# Patient Record
Sex: MALE | Race: WHITE | NOT HISPANIC OR LATINO | Employment: FULL TIME | URBAN - METROPOLITAN AREA
[De-identification: names, ages, dates, MRNs, and addresses within clinical notes are randomized per-mention and may not be internally consistent; named-entity substitution may affect disease eponyms.]

---

## 2020-01-22 ENCOUNTER — OFFICE VISIT (OUTPATIENT)
Dept: PRIMARY CARE CLINIC | Facility: CLINIC | Age: 35
End: 2020-01-22
Payer: COMMERCIAL

## 2020-01-22 VITALS
TEMPERATURE: 98 F | RESPIRATION RATE: 18 BRPM | DIASTOLIC BLOOD PRESSURE: 86 MMHG | OXYGEN SATURATION: 98 % | SYSTOLIC BLOOD PRESSURE: 140 MMHG | BODY MASS INDEX: 24.81 KG/M2 | HEIGHT: 69 IN | HEART RATE: 68 BPM | WEIGHT: 167.5 LBS

## 2020-01-22 DIAGNOSIS — Z87.442 HISTORY OF NEPHROLITHIASIS: ICD-10-CM

## 2020-01-22 DIAGNOSIS — L30.1 DYSHIDROSIS: ICD-10-CM

## 2020-01-22 DIAGNOSIS — Z23 NEED FOR VACCINATION: ICD-10-CM

## 2020-01-22 DIAGNOSIS — I10 HYPERTENSION, UNSPECIFIED TYPE: Primary | ICD-10-CM

## 2020-01-22 DIAGNOSIS — F32.A DEPRESSION, UNSPECIFIED DEPRESSION TYPE: ICD-10-CM

## 2020-01-22 PROCEDURE — 3008F PR BODY MASS INDEX (BMI) DOCUMENTED: ICD-10-PCS | Mod: CPTII,S$GLB,, | Performed by: FAMILY MEDICINE

## 2020-01-22 PROCEDURE — 99203 OFFICE O/P NEW LOW 30 MIN: CPT | Mod: 25,S$GLB,, | Performed by: FAMILY MEDICINE

## 2020-01-22 PROCEDURE — 3079F PR MOST RECENT DIASTOLIC BLOOD PRESSURE 80-89 MM HG: ICD-10-PCS | Mod: CPTII,S$GLB,, | Performed by: FAMILY MEDICINE

## 2020-01-22 PROCEDURE — 90714 TD VACC NO PRESV 7 YRS+ IM: CPT | Mod: S$GLB,,, | Performed by: FAMILY MEDICINE

## 2020-01-22 PROCEDURE — 99999 PR PBB SHADOW E&M-NEW PATIENT-LVL III: CPT | Mod: PBBFAC,,, | Performed by: FAMILY MEDICINE

## 2020-01-22 PROCEDURE — 90471 TD VACCINE GREATER THAN OR EQUAL TO 7YO PRESERVATIVE FREE IM: ICD-10-PCS | Mod: S$GLB,,, | Performed by: FAMILY MEDICINE

## 2020-01-22 PROCEDURE — 99999 PR PBB SHADOW E&M-NEW PATIENT-LVL III: ICD-10-PCS | Mod: PBBFAC,,, | Performed by: FAMILY MEDICINE

## 2020-01-22 PROCEDURE — 3008F BODY MASS INDEX DOCD: CPT | Mod: CPTII,S$GLB,, | Performed by: FAMILY MEDICINE

## 2020-01-22 PROCEDURE — 3077F SYST BP >= 140 MM HG: CPT | Mod: CPTII,S$GLB,, | Performed by: FAMILY MEDICINE

## 2020-01-22 PROCEDURE — 90714 TD VACCINE GREATER THAN OR EQUAL TO 7YO PRESERVATIVE FREE IM: ICD-10-PCS | Mod: S$GLB,,, | Performed by: FAMILY MEDICINE

## 2020-01-22 PROCEDURE — 99203 PR OFFICE/OUTPT VISIT, NEW, LEVL III, 30-44 MIN: ICD-10-PCS | Mod: 25,S$GLB,, | Performed by: FAMILY MEDICINE

## 2020-01-22 PROCEDURE — 90471 IMMUNIZATION ADMIN: CPT | Mod: S$GLB,,, | Performed by: FAMILY MEDICINE

## 2020-01-22 PROCEDURE — 3077F PR MOST RECENT SYSTOLIC BLOOD PRESSURE >= 140 MM HG: ICD-10-PCS | Mod: CPTII,S$GLB,, | Performed by: FAMILY MEDICINE

## 2020-01-22 PROCEDURE — 3079F DIAST BP 80-89 MM HG: CPT | Mod: CPTII,S$GLB,, | Performed by: FAMILY MEDICINE

## 2020-01-22 RX ORDER — HYDROCHLOROTHIAZIDE 25 MG/1
25 TABLET ORAL DAILY
Qty: 30 TABLET | Refills: 1 | Status: SHIPPED | OUTPATIENT
Start: 2020-01-22 | End: 2020-05-05

## 2020-01-22 RX ORDER — LOSARTAN POTASSIUM 25 MG/1
25 TABLET ORAL DAILY
COMMUNITY
End: 2020-01-22 | Stop reason: SDUPTHER

## 2020-01-22 RX ORDER — ESCITALOPRAM OXALATE 10 MG/1
10 TABLET ORAL DAILY
Qty: 30 TABLET | Refills: 1 | Status: SHIPPED | OUTPATIENT
Start: 2020-01-22 | End: 2020-05-05

## 2020-01-22 RX ORDER — LOSARTAN POTASSIUM 25 MG/1
25 TABLET ORAL DAILY
Qty: 30 TABLET | Refills: 1 | Status: SHIPPED | OUTPATIENT
Start: 2020-01-22 | End: 2020-05-05

## 2020-01-22 RX ORDER — HYDROCHLOROTHIAZIDE 25 MG/1
25 TABLET ORAL DAILY
COMMUNITY
End: 2020-01-22 | Stop reason: SDUPTHER

## 2020-01-22 RX ORDER — ESCITALOPRAM OXALATE 10 MG/1
10 TABLET ORAL DAILY
COMMUNITY
End: 2020-01-22 | Stop reason: SDUPTHER

## 2020-01-22 NOTE — PROGRESS NOTES
Subjective:       Patient ID: Pravin Galindo is a 34 y.o. male.    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  Patient was a  in a very busy crazy restaurant.  Patient been off his medication blood pressure now 140/86 was on hydrochlorothiazide losartan..  Hypertension runs in pay since family in the combination of losartan and hydrochlorothiazide works for his asthma.  Lexapro works well, levels out peaks and valleys.   ROS:  Skin: no psoriasis, eczema, skin cancer--history some decide row cyst on the hands bilaterally was treated with a cream  HEENT: No headache, ocular pain, blurred vision, diplopia, epistaxis, hoarseness change in voice, thyroid trouble  Lung: No pneumonia, asthma, Tb, wheezing, SOB, no smoking +VAPS   Heart: No chest pain, ankle edema, palpitations, MI, radha murmur, +hypertension, no  Hyperlipidemia--no stent bypass arrhythmia  Abdomen: No nausea, vomiting, diarrhea, constipation, ulcers, hepatitis, gallbladder disease, melena, hematochezia, hematemesis  : no UTI, renal disease, history nephrolithiasis x2  MS: no fractures, O/A, lupus, rheumatoid, gout--Hx fx left wrist or forearm   Neuro: No dizziness, LOC, seizures   No diabetes, no anemia, no anxiety, +  depression   , one daughter 2 years old, works as a teacher [employer] school, 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    Objective:   Physical Exam:  General: Well nourished, well developed, no acute distress +thin  Skin: No lesions  HEENT: Eyes PERRLA, EOM intact, nose patent, throat non-erythematous ears TMs clear  NECK: Supple, no bruits, No JVD, no nodes  Lungs: Clear, no rales, rhonchi, wheezing  Heart: Regular rate and rhythm, no murmurs, gallops, or rubs  Abdomen: flat, bowel sounds positive, no tenderness, or organomegaly  MS: Range of motion and muscle strength intact  reflexes 2/4  Neuro: Alert, CN intact, oriented X 3 Romberg negative heel-toe intact  Extremities: No cyanosis, clubbing, or edema         Assessment:       1. Hypertension, unspecified type    2. Depression, unspecified depression type    3. Need for vaccination    4. History of nephrolithiasis    5. Dyshidrosis        Plan:       Hypertension, unspecified type  -     hydroCHLOROthiazide (HYDRODIURIL) 25 MG tablet; Take 1 tablet (25 mg total) by mouth once daily.  Dispense: 30 tablet; Refill: 1  -     losartan (COZAAR) 25 MG tablet; Take 1 tablet (25 mg total) by mouth once daily.  Dispense: 30 tablet; Refill: 1  -     CBC auto differential; Future; Expected date: 01/22/2020  -     Comprehensive metabolic panel; Future; Expected date: 01/22/2020  -     EKG 12-lead; Future  -     Fecal Immunochemical Test (iFOBT); Future; Expected date: 01/22/2020  -     Lipid panel; Future; Expected date: 01/22/2020  -     X-Ray Chest PA And Lateral; Future; Expected date: 01/22/2020  -     POCT urine dipstick without microscope  -     T4, free; Future; Expected date: 01/22/2020  -     TSH; Future; Expected date: 01/22/2020    Depression, unspecified depression type  -     escitalopram oxalate (LEXAPRO) 10 MG tablet; Take 1 tablet (10 mg total) by mouth once daily.  Dispense: 30 tablet; Refill: 1    Need for vaccination  -     (In Office Administered) Td Vaccine - Preservative Free    History of nephrolithiasis    Dyshidrosis      history hypertension--did well on losartan 25 mg q.d. 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  History dyshidrosis can use Valisone cream p.r.n.  History of nephrolithiasis  Patient needs physical 1 desires CBCs CMP lipids T4 TSH stool guaiac UA chest x-ray EKG as a baseline physical  Health  maintenance lipid HIV tetanus flu shot

## 2020-01-22 NOTE — LETTER
January 22, 2020      No Recipients           [facility]  [address] [facility]  [facility] [address]  Phone: [phone]  Fax: [phone]          Patient: Pravin Galindo   MR Number: [id number]   YOB: 1985   Date of Visit: 1/22/2020       Dear :    Thank you for referring Pravin Galindo to me for evaluation. Attached you will find relevant portions of my assessment and plan of care.    If you have questions, please do not hesitate to call me. I look forward to following Pravin Galindo along with you.    Sincerely,    Phillip Castañeda MD    Enclosure  CC:  No Recipients    If you would like to receive this communication electronically, please contact [email or web address] or [phone] to request more information on [employer] Link access.    For providers and/or their staff who would like to refer a patient to [facility], please contact us through our one-stop-shop provider referral line, Nimo Swift, at [phone].    If you feel you have received this communication in error or would no longer like to receive these types of communications, please e-mail [email or web address]

## 2020-01-29 ENCOUNTER — CLINICAL SUPPORT (OUTPATIENT)
Dept: PRIMARY CARE CLINIC | Facility: CLINIC | Age: 35
End: 2020-01-29
Payer: COMMERCIAL

## 2020-01-29 DIAGNOSIS — I10 HYPERTENSION, UNSPECIFIED TYPE: ICD-10-CM

## 2020-01-29 LAB
ALBUMIN SERPL BCP-MCNC: 5.1 G/DL (ref 3.5–5.2)
ALP SERPL-CCNC: 50 U/L (ref 38–126)
ALT SERPL W/O P-5'-P-CCNC: 17 U/L (ref 17–63)
ANION GAP SERPL CALC-SCNC: 14 MMOL/L (ref 8–16)
AST SERPL-CCNC: 27 U/L (ref 15–41)
BASOPHILS # BLD AUTO: 0 K/UL (ref 0–0.2)
BASOPHILS NFR BLD: 1 % (ref 0–1.9)
BILIRUB SERPL-MCNC: 1.2 MG/DL (ref 0.3–1.2)
BUN SERPL-MCNC: 11 MG/DL (ref 6–20)
CALCIUM SERPL-MCNC: 9.5 MG/DL (ref 8.6–10)
CHLORIDE SERPL-SCNC: 99 MMOL/L (ref 101–111)
CHOLEST SERPL-MCNC: 243 MG/DL (ref 80–200)
CHOLEST/HDLC SERPL: 2.7 {RATIO} (ref 2–5)
CO2 SERPL-SCNC: 23 MMOL/L (ref 23–29)
CREAT SERPL-MCNC: 0.8 MG/DL (ref 0.5–1.4)
DIFFERENTIAL METHOD: ABNORMAL
EOSINOPHIL # BLD AUTO: 0.1 K/UL (ref 0–0.5)
EOSINOPHIL NFR BLD: 1.9 % (ref 0–8)
ERYTHROCYTE [DISTWIDTH] IN BLOOD BY AUTOMATED COUNT: 13.8 % (ref 11.5–14.5)
EST. GFR  (AFRICAN AMERICAN): >60 ML/MIN/1.73 M^2
EST. GFR  (NON AFRICAN AMERICAN): >60 ML/MIN/1.73 M^2
GLUCOSE SERPL-MCNC: 96 MG/DL (ref 74–118)
HCT VFR BLD AUTO: 46.3 % (ref 40–54)
HDLC SERPL-MCNC: 91 MG/DL (ref 40–75)
HDLC SERPL: 37.4 % (ref 20–50)
HGB BLD-MCNC: 16 G/DL (ref 14–18)
LDLC SERPL CALC-MCNC: 137 MG/DL
LYMPHOCYTES # BLD AUTO: 2.3 K/UL (ref 1–4.8)
LYMPHOCYTES NFR BLD: 50.1 % (ref 18–48)
MCH RBC QN AUTO: 32.2 PG (ref 27–31)
MCHC RBC AUTO-ENTMCNC: 34.4 G/DL (ref 32–36)
MCV RBC AUTO: 94 FL (ref 82–98)
MONOCYTES # BLD AUTO: 0.4 K/UL (ref 0.3–1)
MONOCYTES NFR BLD: 7.7 % (ref 4–15)
NEUTROPHILS # BLD AUTO: 1.8 K/UL (ref 1.8–7.7)
NEUTROPHILS NFR BLD: 39.3 % (ref 38–73)
NONHDLC SERPL-MCNC: 152 MG/DL
PLATELET # BLD AUTO: 256 K/UL (ref 150–350)
PMV BLD AUTO: 7.8 FL (ref 9.2–12.9)
POTASSIUM SERPL-SCNC: 3.4 MMOL/L (ref 3.5–5.1)
PROT SERPL-MCNC: 8 G/DL (ref 6–8.4)
RBC # BLD AUTO: 4.95 M/UL (ref 4.6–6.2)
SODIUM SERPL-SCNC: 136 MMOL/L (ref 136–145)
T4 FREE SERPL-MCNC: 1.01 NG/DL (ref 0.61–1.12)
TRIGL SERPL-MCNC: 75 MG/DL (ref 30–150)
TSH SERPL DL<=0.005 MIU/L-ACNC: 0.8 UIU/ML (ref 0.45–5.33)
WBC # BLD AUTO: 4.7 K/UL (ref 3.9–12.7)

## 2020-01-29 PROCEDURE — 84439 ASSAY OF FREE THYROXINE: CPT

## 2020-01-29 PROCEDURE — 36415 COLL VENOUS BLD VENIPUNCTURE: CPT | Mod: S$GLB,,, | Performed by: FAMILY MEDICINE

## 2020-01-29 PROCEDURE — 36415 PR COLLECTION VENOUS BLOOD,VENIPUNCTURE: ICD-10-PCS | Mod: S$GLB,,, | Performed by: FAMILY MEDICINE

## 2020-01-29 PROCEDURE — 80053 COMPREHEN METABOLIC PANEL: CPT

## 2020-01-29 PROCEDURE — 93010 EKG 12-LEAD: ICD-10-PCS | Mod: S$GLB,,, | Performed by: INTERNAL MEDICINE

## 2020-01-29 PROCEDURE — 85025 COMPLETE CBC W/AUTO DIFF WBC: CPT

## 2020-01-29 PROCEDURE — 84443 ASSAY THYROID STIM HORMONE: CPT

## 2020-01-29 PROCEDURE — 93005 EKG 12-LEAD: ICD-10-PCS | Mod: S$GLB,,, | Performed by: FAMILY MEDICINE

## 2020-01-29 PROCEDURE — 80061 LIPID PANEL: CPT

## 2020-01-29 PROCEDURE — 93010 ELECTROCARDIOGRAM REPORT: CPT | Mod: S$GLB,,, | Performed by: INTERNAL MEDICINE

## 2020-01-29 PROCEDURE — 93005 ELECTROCARDIOGRAM TRACING: CPT | Mod: S$GLB,,, | Performed by: FAMILY MEDICINE

## 2020-02-05 DIAGNOSIS — I10 HYPERTENSION, UNSPECIFIED TYPE: Primary | ICD-10-CM

## 2020-02-05 NOTE — TELEPHONE ENCOUNTER
----- Message from Phillip Castañeda MD sent at 2/1/2020  5:54 PM CST -----  Call tell pt  K 3.4 needs be on Micro K 10 1 po q d #30 5 refills Chol 243 better 180  betternif 100 HDL 91 excellent Low Na Low fat diet exercise as abler decrease weight if needed Rest lab WNL no new tx redo lab 6 mo CBC.CMP, lipid see me 2 weeks later

## 2020-02-10 RX ORDER — POTASSIUM CHLORIDE 750 MG/1
10 CAPSULE, EXTENDED RELEASE ORAL DAILY
Qty: 30 CAPSULE | Refills: 5 | Status: SHIPPED | OUTPATIENT
Start: 2020-02-10 | End: 2020-08-16

## 2020-05-01 DIAGNOSIS — F32.A DEPRESSION, UNSPECIFIED DEPRESSION TYPE: ICD-10-CM

## 2020-05-01 DIAGNOSIS — I10 HYPERTENSION, UNSPECIFIED TYPE: ICD-10-CM

## 2020-05-05 RX ORDER — ESCITALOPRAM OXALATE 10 MG/1
TABLET ORAL
Qty: 90 TABLET | Refills: 1 | Status: SHIPPED | OUTPATIENT
Start: 2020-05-05 | End: 2021-06-08

## 2020-05-05 RX ORDER — LOSARTAN POTASSIUM 25 MG/1
TABLET ORAL
Qty: 90 TABLET | Refills: 1 | Status: SHIPPED | OUTPATIENT
Start: 2020-05-05 | End: 2021-06-08

## 2020-05-05 RX ORDER — HYDROCHLOROTHIAZIDE 25 MG/1
TABLET ORAL
Qty: 90 TABLET | Refills: 1 | Status: SHIPPED | OUTPATIENT
Start: 2020-05-05 | End: 2021-06-08

## 2020-09-18 ENCOUNTER — TELEPHONE (OUTPATIENT)
Dept: PRIMARY CARE CLINIC | Facility: CLINIC | Age: 35
End: 2020-09-18

## 2021-04-20 ENCOUNTER — PATIENT OUTREACH (OUTPATIENT)
Dept: ADMINISTRATIVE | Facility: HOSPITAL | Age: 36
End: 2021-04-20

## 2021-06-08 ENCOUNTER — OFFICE VISIT (OUTPATIENT)
Dept: PRIMARY CARE CLINIC | Facility: CLINIC | Age: 36
End: 2021-06-08
Payer: COMMERCIAL

## 2021-06-08 VITALS
HEIGHT: 69 IN | BODY MASS INDEX: 24.31 KG/M2 | OXYGEN SATURATION: 98 % | RESPIRATION RATE: 18 BRPM | HEART RATE: 104 BPM | DIASTOLIC BLOOD PRESSURE: 100 MMHG | SYSTOLIC BLOOD PRESSURE: 164 MMHG | WEIGHT: 164.13 LBS

## 2021-06-08 DIAGNOSIS — Z11.59 NEED FOR HEPATITIS C SCREENING TEST: Primary | ICD-10-CM

## 2021-06-08 DIAGNOSIS — F32.A DEPRESSION, UNSPECIFIED DEPRESSION TYPE: ICD-10-CM

## 2021-06-08 DIAGNOSIS — I10 HYPERTENSION, UNSPECIFIED TYPE: ICD-10-CM

## 2021-06-08 PROCEDURE — 1126F PR PAIN SEVERITY QUANTIFIED, NO PAIN PRESENT: ICD-10-PCS | Mod: S$GLB,,, | Performed by: FAMILY MEDICINE

## 2021-06-08 PROCEDURE — 3008F BODY MASS INDEX DOCD: CPT | Mod: CPTII,S$GLB,, | Performed by: FAMILY MEDICINE

## 2021-06-08 PROCEDURE — 1126F AMNT PAIN NOTED NONE PRSNT: CPT | Mod: S$GLB,,, | Performed by: FAMILY MEDICINE

## 2021-06-08 PROCEDURE — 99999 PR PBB SHADOW E&M-EST. PATIENT-LVL III: CPT | Mod: PBBFAC,,, | Performed by: FAMILY MEDICINE

## 2021-06-08 PROCEDURE — 99395 PR PREVENTIVE VISIT,EST,18-39: ICD-10-PCS | Mod: S$GLB,,, | Performed by: FAMILY MEDICINE

## 2021-06-08 PROCEDURE — 99395 PREV VISIT EST AGE 18-39: CPT | Mod: S$GLB,,, | Performed by: FAMILY MEDICINE

## 2021-06-08 PROCEDURE — 99999 PR PBB SHADOW E&M-EST. PATIENT-LVL III: ICD-10-PCS | Mod: PBBFAC,,, | Performed by: FAMILY MEDICINE

## 2021-06-08 PROCEDURE — 3008F PR BODY MASS INDEX (BMI) DOCUMENTED: ICD-10-PCS | Mod: CPTII,S$GLB,, | Performed by: FAMILY MEDICINE

## 2021-06-08 RX ORDER — LOSARTAN POTASSIUM AND HYDROCHLOROTHIAZIDE 25; 100 MG/1; MG/1
1 TABLET ORAL DAILY
Qty: 90 TABLET | Refills: 3 | Status: SHIPPED | OUTPATIENT
Start: 2021-06-08 | End: 2021-06-24

## 2021-06-22 ENCOUNTER — CLINICAL SUPPORT (OUTPATIENT)
Dept: PRIMARY CARE CLINIC | Facility: CLINIC | Age: 36
End: 2021-06-22
Payer: COMMERCIAL

## 2021-06-22 VITALS — DIASTOLIC BLOOD PRESSURE: 68 MMHG | OXYGEN SATURATION: 97 % | SYSTOLIC BLOOD PRESSURE: 114 MMHG | HEART RATE: 107 BPM

## 2021-06-22 DIAGNOSIS — I10 HYPERTENSION, UNSPECIFIED TYPE: Primary | ICD-10-CM

## 2021-06-22 PROCEDURE — 99999 PR PBB SHADOW E&M-EST. PATIENT-LVL II: ICD-10-PCS | Mod: PBBFAC,,,

## 2021-06-22 PROCEDURE — 99999 PR PBB SHADOW E&M-EST. PATIENT-LVL II: CPT | Mod: PBBFAC,,,

## 2021-06-24 DIAGNOSIS — E83.52 HYPERCALCEMIA: Primary | ICD-10-CM

## 2021-06-24 RX ORDER — LOSARTAN POTASSIUM 100 MG/1
100 TABLET ORAL DAILY
Qty: 90 TABLET | Refills: 3 | Status: SHIPPED | OUTPATIENT
Start: 2021-06-24 | End: 2021-07-09 | Stop reason: CLARIF

## 2021-06-30 ENCOUNTER — TELEPHONE (OUTPATIENT)
Dept: PRIMARY CARE CLINIC | Facility: CLINIC | Age: 36
End: 2021-06-30

## 2021-07-09 RX ORDER — LOSARTAN POTASSIUM AND HYDROCHLOROTHIAZIDE 25; 100 MG/1; MG/1
1 TABLET ORAL DAILY
COMMUNITY
End: 2022-08-05

## 2022-06-14 ENCOUNTER — NURSE TRIAGE (OUTPATIENT)
Dept: ADMINISTRATIVE | Facility: CLINIC | Age: 37
End: 2022-06-14
Payer: COMMERCIAL

## 2022-06-14 NOTE — TELEPHONE ENCOUNTER
OOC RN   Patient calling about experiencing heart tightness and seizing up. Feels like something is  Sitting on chest,  Starting yesterday, pain 7/10  Intermittent,   Care advise is to call up.  911  Patient and wife want to just go to ED stating they only live 5 mins away.  I advise care advise is to call 911 now.   Wife and  VU.   Reason for Disposition   SEVERE difficulty breathing (e.g., struggling for each breath, speaks in single words)    Protocols used: CHEST PAIN-A-OH

## 2022-06-28 ENCOUNTER — TELEPHONE (OUTPATIENT)
Dept: PSYCHOLOGY | Facility: CLINIC | Age: 37
End: 2022-06-28
Payer: COMMERCIAL

## 2022-06-28 ENCOUNTER — OFFICE VISIT (OUTPATIENT)
Dept: PRIMARY CARE CLINIC | Facility: CLINIC | Age: 37
End: 2022-06-28
Payer: COMMERCIAL

## 2022-06-28 VITALS
WEIGHT: 162.81 LBS | OXYGEN SATURATION: 96 % | SYSTOLIC BLOOD PRESSURE: 128 MMHG | HEART RATE: 85 BPM | RESPIRATION RATE: 18 BRPM | HEIGHT: 69 IN | DIASTOLIC BLOOD PRESSURE: 84 MMHG | BODY MASS INDEX: 24.11 KG/M2

## 2022-06-28 DIAGNOSIS — Z87.442 HISTORY OF NEPHROLITHIASIS: Primary | ICD-10-CM

## 2022-06-28 DIAGNOSIS — S39.012D LUMBOSACRAL STRAIN, SUBSEQUENT ENCOUNTER: ICD-10-CM

## 2022-06-28 DIAGNOSIS — R07.89 ATYPICAL CHEST PAIN: ICD-10-CM

## 2022-06-28 DIAGNOSIS — Z00.00 ANNUAL PHYSICAL EXAM: ICD-10-CM

## 2022-06-28 DIAGNOSIS — I10 HYPERTENSION, UNSPECIFIED TYPE: ICD-10-CM

## 2022-06-28 DIAGNOSIS — F32.9 MAJOR DEPRESSIVE DISORDER WITH SINGLE EPISODE, REMISSION STATUS UNSPECIFIED: ICD-10-CM

## 2022-06-28 PROBLEM — S39.012A LUMBOSACRAL STRAIN: Status: ACTIVE | Noted: 2022-06-28

## 2022-06-28 PROCEDURE — 93010 EKG 12-LEAD: ICD-10-PCS | Mod: S$GLB,,, | Performed by: INTERNAL MEDICINE

## 2022-06-28 PROCEDURE — 3074F SYST BP LT 130 MM HG: CPT | Mod: CPTII,S$GLB,, | Performed by: FAMILY MEDICINE

## 2022-06-28 PROCEDURE — 3008F PR BODY MASS INDEX (BMI) DOCUMENTED: ICD-10-PCS | Mod: CPTII,S$GLB,, | Performed by: FAMILY MEDICINE

## 2022-06-28 PROCEDURE — 3079F PR MOST RECENT DIASTOLIC BLOOD PRESSURE 80-89 MM HG: ICD-10-PCS | Mod: CPTII,S$GLB,, | Performed by: FAMILY MEDICINE

## 2022-06-28 PROCEDURE — 3074F PR MOST RECENT SYSTOLIC BLOOD PRESSURE < 130 MM HG: ICD-10-PCS | Mod: CPTII,S$GLB,, | Performed by: FAMILY MEDICINE

## 2022-06-28 PROCEDURE — 93010 ELECTROCARDIOGRAM REPORT: CPT | Mod: S$GLB,,, | Performed by: INTERNAL MEDICINE

## 2022-06-28 PROCEDURE — 99214 OFFICE O/P EST MOD 30 MIN: CPT | Mod: S$GLB,,, | Performed by: FAMILY MEDICINE

## 2022-06-28 PROCEDURE — 99214 PR OFFICE/OUTPT VISIT, EST, LEVL IV, 30-39 MIN: ICD-10-PCS | Mod: S$GLB,,, | Performed by: FAMILY MEDICINE

## 2022-06-28 PROCEDURE — 93005 ELECTROCARDIOGRAM TRACING: CPT | Mod: S$GLB,,, | Performed by: FAMILY MEDICINE

## 2022-06-28 PROCEDURE — 3079F DIAST BP 80-89 MM HG: CPT | Mod: CPTII,S$GLB,, | Performed by: FAMILY MEDICINE

## 2022-06-28 PROCEDURE — 99999 PR PBB SHADOW E&M-EST. PATIENT-LVL III: ICD-10-PCS | Mod: PBBFAC,,, | Performed by: FAMILY MEDICINE

## 2022-06-28 PROCEDURE — 93005 EKG 12-LEAD: ICD-10-PCS | Mod: S$GLB,,, | Performed by: FAMILY MEDICINE

## 2022-06-28 PROCEDURE — 3008F BODY MASS INDEX DOCD: CPT | Mod: CPTII,S$GLB,, | Performed by: FAMILY MEDICINE

## 2022-06-28 PROCEDURE — 99999 PR PBB SHADOW E&M-EST. PATIENT-LVL III: CPT | Mod: PBBFAC,,, | Performed by: FAMILY MEDICINE

## 2022-06-28 PROCEDURE — 1159F PR MEDICATION LIST DOCUMENTED IN MEDICAL RECORD: ICD-10-PCS | Mod: CPTII,S$GLB,, | Performed by: FAMILY MEDICINE

## 2022-06-28 PROCEDURE — 1159F MED LIST DOCD IN RCRD: CPT | Mod: CPTII,S$GLB,, | Performed by: FAMILY MEDICINE

## 2022-06-28 RX ORDER — LORAZEPAM 1 MG/1
TABLET ORAL
Qty: 30 TABLET | Refills: 2 | Status: SHIPPED | OUTPATIENT
Start: 2022-06-28 | End: 2023-06-28 | Stop reason: SDUPTHER

## 2022-06-28 RX ORDER — ESCITALOPRAM OXALATE 10 MG/1
10 TABLET ORAL DAILY
Qty: 30 TABLET | Refills: 11 | Status: SHIPPED | OUTPATIENT
Start: 2022-06-28 | End: 2023-06-28
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
## 2022-06-28 NOTE — TELEPHONE ENCOUNTER
----- Message from Zaida Cortes LCSW sent at 6/28/2022 10:50 AM CDT -----  Regarding: FW: Major depressive disorder with single episode, remission status unspecified    ----- Message -----  From: Christopher Reeder  Sent: 6/28/2022  10:25 AM CDT  To: Zaida Cortes LCSW  Subject: Major depressive disorder with single episod#    Dr. Phillip Castañeda has a  referral/consult to Psychiatry for this patient. Please schedule and call patient. Thank you!

## 2022-06-29 ENCOUNTER — PATIENT MESSAGE (OUTPATIENT)
Dept: DIABETES | Facility: CLINIC | Age: 37
End: 2022-06-29
Payer: COMMERCIAL

## 2022-06-29 ENCOUNTER — TELEPHONE (OUTPATIENT)
Dept: PSYCHOLOGY | Facility: CLINIC | Age: 37
End: 2022-06-29
Payer: COMMERCIAL

## 2022-06-29 NOTE — TELEPHONE ENCOUNTER
----- Message from Zaida Cortes LCSW sent at 6/29/2022  2:01 PM CDT -----    ----- Message -----  From: Tegan Gutierrez  Sent: 6/29/2022   1:49 PM CDT  To: Zaida Cortes LCSW    Major depressive disorder with single episode, remission status unspecified [F32... referral in please call patient back top schedule appointment

## 2022-07-12 ENCOUNTER — OFFICE VISIT (OUTPATIENT)
Dept: PRIMARY CARE CLINIC | Facility: CLINIC | Age: 37
End: 2022-07-12
Payer: COMMERCIAL

## 2022-07-12 VITALS
HEART RATE: 90 BPM | SYSTOLIC BLOOD PRESSURE: 126 MMHG | RESPIRATION RATE: 18 BRPM | OXYGEN SATURATION: 97 % | WEIGHT: 161.06 LBS | DIASTOLIC BLOOD PRESSURE: 76 MMHG | BODY MASS INDEX: 23.86 KG/M2 | HEIGHT: 69 IN

## 2022-07-12 DIAGNOSIS — I10 HYPERTENSION, UNSPECIFIED TYPE: ICD-10-CM

## 2022-07-12 DIAGNOSIS — Z87.442 HISTORY OF NEPHROLITHIASIS: ICD-10-CM

## 2022-07-12 DIAGNOSIS — F32.0 CURRENT MILD EPISODE OF MAJOR DEPRESSIVE DISORDER WITHOUT PRIOR EPISODE: Primary | ICD-10-CM

## 2022-07-12 DIAGNOSIS — S16.1XXD STRAIN OF NECK MUSCLE, SUBSEQUENT ENCOUNTER: ICD-10-CM

## 2022-07-12 DIAGNOSIS — L30.1 DYSHIDROSIS: ICD-10-CM

## 2022-07-12 PROBLEM — S16.1XXA CERVICAL STRAIN: Status: ACTIVE | Noted: 2022-07-12

## 2022-07-12 PROCEDURE — 99214 OFFICE O/P EST MOD 30 MIN: CPT | Mod: S$GLB,,, | Performed by: FAMILY MEDICINE

## 2022-07-12 PROCEDURE — 1159F MED LIST DOCD IN RCRD: CPT | Mod: CPTII,S$GLB,, | Performed by: FAMILY MEDICINE

## 2022-07-12 PROCEDURE — 1159F PR MEDICATION LIST DOCUMENTED IN MEDICAL RECORD: ICD-10-PCS | Mod: CPTII,S$GLB,, | Performed by: FAMILY MEDICINE

## 2022-07-12 PROCEDURE — 99999 PR PBB SHADOW E&M-EST. PATIENT-LVL III: CPT | Mod: PBBFAC,,, | Performed by: FAMILY MEDICINE

## 2022-07-12 PROCEDURE — 3008F PR BODY MASS INDEX (BMI) DOCUMENTED: ICD-10-PCS | Mod: CPTII,S$GLB,, | Performed by: FAMILY MEDICINE

## 2022-07-12 PROCEDURE — 99214 PR OFFICE/OUTPT VISIT, EST, LEVL IV, 30-39 MIN: ICD-10-PCS | Mod: S$GLB,,, | Performed by: FAMILY MEDICINE

## 2022-07-12 PROCEDURE — 99999 PR PBB SHADOW E&M-EST. PATIENT-LVL III: ICD-10-PCS | Mod: PBBFAC,,, | Performed by: FAMILY MEDICINE

## 2022-07-12 PROCEDURE — 3074F PR MOST RECENT SYSTOLIC BLOOD PRESSURE < 130 MM HG: ICD-10-PCS | Mod: CPTII,S$GLB,, | Performed by: FAMILY MEDICINE

## 2022-07-12 PROCEDURE — 3078F DIAST BP <80 MM HG: CPT | Mod: CPTII,S$GLB,, | Performed by: FAMILY MEDICINE

## 2022-07-12 PROCEDURE — 3008F BODY MASS INDEX DOCD: CPT | Mod: CPTII,S$GLB,, | Performed by: FAMILY MEDICINE

## 2022-07-12 PROCEDURE — 3078F PR MOST RECENT DIASTOLIC BLOOD PRESSURE < 80 MM HG: ICD-10-PCS | Mod: CPTII,S$GLB,, | Performed by: FAMILY MEDICINE

## 2022-07-12 PROCEDURE — 3074F SYST BP LT 130 MM HG: CPT | Mod: CPTII,S$GLB,, | Performed by: FAMILY MEDICINE
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
## 2022-08-04 NOTE — TELEPHONE ENCOUNTER
No new care gaps identified.  [facility] Embedded Care Gaps. Reference number: [id number]. 8/04/2022   3:40:54 AM ETTAT

## 2022-08-04 NOTE — TELEPHONE ENCOUNTER
Refill Routing Note   Medication(s) are not appropriate for processing by [facility] for the following reason(s):      - Required laboratory values are outdated    [facility] action(s):  Defer          Medication reconciliation completed: No     Appointments  past 12m or future 3m with PCP    Date Provider   Last Visit   7/12/2022 Phillip Castañeda MD   Next Visit   Visit date not found Phillip Castañeda MD   ED visits in past 90 days: 0        Note composed:1:38 PM 08/04/2022

## 2022-08-05 RX ORDER — LOSARTAN POTASSIUM AND HYDROCHLOROTHIAZIDE 25; 100 MG/1; MG/1
TABLET ORAL
Qty: 90 TABLET | Refills: 3 | Status: SHIPPED | OUTPATIENT
Start: 2022-08-05 | End: 2023-06-28 | Stop reason: SDUPTHER

## 2022-10-31 DIAGNOSIS — R79.89 ELEVATED LIVER FUNCTION TESTS: Primary | ICD-10-CM

## 2022-11-02 ENCOUNTER — OFFICE VISIT (OUTPATIENT)
Dept: PRIMARY CARE CLINIC | Facility: CLINIC | Age: 37
End: 2022-11-02
Payer: COMMERCIAL

## 2022-11-02 DIAGNOSIS — R71.8 ELEVATED MCV: ICD-10-CM

## 2022-11-02 DIAGNOSIS — R79.89 ELEVATED LIVER FUNCTION TESTS: ICD-10-CM

## 2022-11-02 DIAGNOSIS — R76.8 ELEVATED ANTINUCLEAR ANTIBODY (ANA) LEVEL: ICD-10-CM

## 2022-11-02 DIAGNOSIS — R76.8 ELEVATED RHEUMATOID FACTOR: ICD-10-CM

## 2022-11-02 DIAGNOSIS — R56.9 CONVULSIONS, UNSPECIFIED CONVULSION TYPE: Primary | ICD-10-CM

## 2022-11-02 PROCEDURE — 99214 OFFICE O/P EST MOD 30 MIN: CPT | Mod: 95,,, | Performed by: FAMILY MEDICINE

## 2022-11-02 PROCEDURE — 3044F HG A1C LEVEL LT 7.0%: CPT | Mod: CPTII,95,, | Performed by: FAMILY MEDICINE

## 2022-11-02 PROCEDURE — 99214 PR OFFICE/OUTPT VISIT, EST, LEVL IV, 30-39 MIN: ICD-10-PCS | Mod: 95,,, | Performed by: FAMILY MEDICINE

## 2022-11-02 PROCEDURE — 3044F PR MOST RECENT HEMOGLOBIN A1C LEVEL <7.0%: ICD-10-PCS | Mod: CPTII,95,, | Performed by: FAMILY MEDICINE
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
## 2022-11-07 ENCOUNTER — TELEPHONE (OUTPATIENT)
Dept: RHEUMATOLOGY | Facility: CLINIC | Age: 37
End: 2022-11-07
Payer: COMMERCIAL

## 2022-11-07 NOTE — TELEPHONE ENCOUNTER
Called the patient and left a message letting him know that I received his referral and that I scheduled him with the first available in Rheumatology. I scheduled the patient with Dr Matson and Dr Tavarez and placed the reminder in the mail. If this appointment does not work he can call to reschedule and be placed on the wait list

## 2022-11-19 DIAGNOSIS — R10.84 GENERALIZED ABDOMINAL PAIN: Primary | ICD-10-CM

## 2022-12-28 ENCOUNTER — OFFICE VISIT (OUTPATIENT)
Dept: RHEUMATOLOGY | Facility: CLINIC | Age: 37
End: 2022-12-28
Payer: COMMERCIAL

## 2022-12-28 ENCOUNTER — LAB VISIT (OUTPATIENT)
Dept: LAB | Facility: HOSPITAL | Age: 37
End: 2022-12-28
Payer: COMMERCIAL

## 2022-12-28 VITALS
HEART RATE: 99 BPM | WEIGHT: 171.06 LBS | HEIGHT: 69 IN | SYSTOLIC BLOOD PRESSURE: 142 MMHG | DIASTOLIC BLOOD PRESSURE: 95 MMHG | BODY MASS INDEX: 25.34 KG/M2

## 2022-12-28 DIAGNOSIS — R76.8 ELEVATED ANTINUCLEAR ANTIBODY (ANA) LEVEL: ICD-10-CM

## 2022-12-28 DIAGNOSIS — R76.8 ELEVATED RHEUMATOID FACTOR: ICD-10-CM

## 2022-12-28 DIAGNOSIS — R79.89 ELEVATED LFTS: ICD-10-CM

## 2022-12-28 DIAGNOSIS — R79.89 ELEVATED LFTS: Primary | ICD-10-CM

## 2022-12-28 LAB
FERRITIN SERPL-MCNC: 813 NG/ML (ref 20–300)
IRON SERPL-MCNC: 108 UG/DL (ref 45–160)
SATURATED IRON: 29 % (ref 20–50)
TOTAL IRON BINDING CAPACITY: 373 UG/DL (ref 250–450)
TRANSFERRIN SERPL-MCNC: 252 MG/DL (ref 200–375)
TRANSFERRIN SERPL-MCNC: 252 MG/DL (ref 200–375)

## 2022-12-28 PROCEDURE — 3077F PR MOST RECENT SYSTOLIC BLOOD PRESSURE >= 140 MM HG: ICD-10-PCS | Mod: CPTII,S$GLB,, | Performed by: INTERNAL MEDICINE

## 2022-12-28 PROCEDURE — 86381 MITOCHONDRIAL ANTIBODY EACH: CPT | Performed by: INTERNAL MEDICINE

## 2022-12-28 PROCEDURE — 1159F MED LIST DOCD IN RCRD: CPT | Mod: CPTII,S$GLB,, | Performed by: INTERNAL MEDICINE

## 2022-12-28 PROCEDURE — 82728 ASSAY OF FERRITIN: CPT | Performed by: INTERNAL MEDICINE

## 2022-12-28 PROCEDURE — 84466 ASSAY OF TRANSFERRIN: CPT | Performed by: INTERNAL MEDICINE

## 2022-12-28 PROCEDURE — 3077F SYST BP >= 140 MM HG: CPT | Mod: CPTII,S$GLB,, | Performed by: INTERNAL MEDICINE

## 2022-12-28 PROCEDURE — 99204 PR OFFICE/OUTPT VISIT, NEW, LEVL IV, 45-59 MIN: ICD-10-PCS | Mod: S$GLB,,, | Performed by: INTERNAL MEDICINE

## 2022-12-28 PROCEDURE — 3080F DIAST BP >= 90 MM HG: CPT | Mod: CPTII,S$GLB,, | Performed by: INTERNAL MEDICINE

## 2022-12-28 PROCEDURE — 3044F PR MOST RECENT HEMOGLOBIN A1C LEVEL <7.0%: ICD-10-PCS | Mod: CPTII,S$GLB,, | Performed by: INTERNAL MEDICINE

## 2022-12-28 PROCEDURE — 99204 OFFICE O/P NEW MOD 45 MIN: CPT | Mod: S$GLB,,, | Performed by: INTERNAL MEDICINE

## 2022-12-28 PROCEDURE — 3044F HG A1C LEVEL LT 7.0%: CPT | Mod: CPTII,S$GLB,, | Performed by: INTERNAL MEDICINE

## 2022-12-28 PROCEDURE — 3008F PR BODY MASS INDEX (BMI) DOCUMENTED: ICD-10-PCS | Mod: CPTII,S$GLB,, | Performed by: INTERNAL MEDICINE

## 2022-12-28 PROCEDURE — 1160F RVW MEDS BY RX/DR IN RCRD: CPT | Mod: CPTII,S$GLB,, | Performed by: INTERNAL MEDICINE

## 2022-12-28 PROCEDURE — 1160F PR REVIEW ALL MEDS BY PRESCRIBER/CLIN PHARMACIST DOCUMENTED: ICD-10-PCS | Mod: CPTII,S$GLB,, | Performed by: INTERNAL MEDICINE

## 2022-12-28 PROCEDURE — 36415 COLL VENOUS BLD VENIPUNCTURE: CPT | Performed by: INTERNAL MEDICINE

## 2022-12-28 PROCEDURE — 1159F PR MEDICATION LIST DOCUMENTED IN MEDICAL RECORD: ICD-10-PCS | Mod: CPTII,S$GLB,, | Performed by: INTERNAL MEDICINE

## 2022-12-28 PROCEDURE — 99999 PR PBB SHADOW E&M-EST. PATIENT-LVL III: ICD-10-PCS | Mod: PBBFAC,,, | Performed by: INTERNAL MEDICINE

## 2022-12-28 PROCEDURE — 3008F BODY MASS INDEX DOCD: CPT | Mod: CPTII,S$GLB,, | Performed by: INTERNAL MEDICINE

## 2022-12-28 PROCEDURE — 86015 ACTIN ANTIBODY EACH: CPT | Performed by: INTERNAL MEDICINE

## 2022-12-28 PROCEDURE — 3080F PR MOST RECENT DIASTOLIC BLOOD PRESSURE >= 90 MM HG: ICD-10-PCS | Mod: CPTII,S$GLB,, | Performed by: INTERNAL MEDICINE

## 2022-12-28 PROCEDURE — 99999 PR PBB SHADOW E&M-EST. PATIENT-LVL III: CPT | Mod: PBBFAC,,, | Performed by: INTERNAL MEDICINE

## 2022-12-28 ASSESSMENT — ROUTINE ASSESSMENT OF PATIENT INDEX DATA (RAPID3)
FATIGUE SCORE: 7
PATIENT GLOBAL ASSESSMENT SCORE: 6
PAIN SCORE: 4
TOTAL RAPID3 SCORE: 3.44
PSYCHOLOGICAL DISTRESS SCORE: 6.6
MDHAQ FUNCTION SCORE: 0.1
AM STIFFNESS SCORE: 1, YES
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
## 2022-12-29 ENCOUNTER — TELEPHONE (OUTPATIENT)
Dept: RHEUMATOLOGY | Facility: CLINIC | Age: 37
End: 2022-12-29
Payer: COMMERCIAL

## 2022-12-29 DIAGNOSIS — R79.89 ELEVATED LFTS: Primary | ICD-10-CM

## 2022-12-29 DIAGNOSIS — R79.89 ELEVATED FERRITIN LEVEL: ICD-10-CM

## 2022-12-29 LAB
MITOCHONDRIA AB TITR SER IF: NORMAL {TITER}
SMOOTH MUSCLE AB TITR SER IF: NORMAL {TITER}

## 2022-12-29 NOTE — PROGRESS NOTES
I have reviewed the notes, assessments, and/or procedures performed this visit, and I concur with the documentation.  He has had several near syncopal episodes.  He has a history of chronic alcohol usage.  As part of the workup he was found to have a positive HUBERT.  Clinically he has no evidence to suggest an underlying connective tissue disease.  He has elevated transaminases.  This is most likely due to his alcohol use but I do need to rule out autoimmune hepatitis.  We have ordered further laboratory studies.  We recommended a neurologic workup.

## 2023-01-17 ENCOUNTER — TELEPHONE (OUTPATIENT)
Dept: HEPATOLOGY | Facility: CLINIC | Age: 38
End: 2023-01-17
Payer: COMMERCIAL

## 2023-01-17 NOTE — TELEPHONE ENCOUNTER
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

## 2023-02-01 ENCOUNTER — OFFICE VISIT (OUTPATIENT)
Dept: HEPATOLOGY | Facility: CLINIC | Age: 38
End: 2023-02-01
Payer: COMMERCIAL

## 2023-02-01 ENCOUNTER — LAB VISIT (OUTPATIENT)
Dept: LAB | Facility: HOSPITAL | Age: 38
End: 2023-02-01
Payer: COMMERCIAL

## 2023-02-01 VITALS
BODY MASS INDEX: 23.74 KG/M2 | HEART RATE: 74 BPM | SYSTOLIC BLOOD PRESSURE: 136 MMHG | TEMPERATURE: 98 F | HEIGHT: 69 IN | RESPIRATION RATE: 16 BRPM | OXYGEN SATURATION: 100 % | WEIGHT: 160.25 LBS | DIASTOLIC BLOOD PRESSURE: 92 MMHG

## 2023-02-01 DIAGNOSIS — R79.89 ELEVATED LFTS: ICD-10-CM

## 2023-02-01 DIAGNOSIS — E78.5 HYPERLIPIDEMIA, UNSPECIFIED HYPERLIPIDEMIA TYPE: ICD-10-CM

## 2023-02-01 DIAGNOSIS — R79.89 ELEVATED FERRITIN LEVEL: ICD-10-CM

## 2023-02-01 DIAGNOSIS — K70.0 ALCOHOL INDUCED FATTY LIVER: ICD-10-CM

## 2023-02-01 DIAGNOSIS — I10 HYPERTENSION, UNSPECIFIED TYPE: ICD-10-CM

## 2023-02-01 DIAGNOSIS — K76.0 HEPATIC STEATOSIS: ICD-10-CM

## 2023-02-01 DIAGNOSIS — K76.0 HEPATIC STEATOSIS: Primary | ICD-10-CM

## 2023-02-01 LAB
ALBUMIN SERPL BCP-MCNC: 4.3 G/DL (ref 3.5–5.2)
ALP SERPL-CCNC: 62 U/L (ref 55–135)
ALT SERPL W/O P-5'-P-CCNC: 41 U/L (ref 10–44)
AST SERPL-CCNC: 30 U/L (ref 10–40)
BILIRUB DIRECT SERPL-MCNC: 0.1 MG/DL (ref 0.1–0.3)
BILIRUB SERPL-MCNC: 0.3 MG/DL (ref 0.1–1)
FERRITIN SERPL-MCNC: 697 NG/ML (ref 20–300)
IRON SERPL-MCNC: 64 UG/DL (ref 45–160)
PROT SERPL-MCNC: 7.8 G/DL (ref 6–8.4)
SATURATED IRON: 19 % (ref 20–50)
TOTAL IRON BINDING CAPACITY: 330 UG/DL (ref 250–450)
TRANSFERRIN SERPL-MCNC: 223 MG/DL (ref 200–375)

## 2023-02-01 PROCEDURE — 99999 PR PBB SHADOW E&M-EST. PATIENT-LVL V: ICD-10-PCS | Mod: PBBFAC,,, | Performed by: NURSE PRACTITIONER

## 2023-02-01 PROCEDURE — 1159F PR MEDICATION LIST DOCUMENTED IN MEDICAL RECORD: ICD-10-PCS | Mod: CPTII,S$GLB,, | Performed by: NURSE PRACTITIONER

## 2023-02-01 PROCEDURE — 1160F PR REVIEW ALL MEDS BY PRESCRIBER/CLIN PHARMACIST DOCUMENTED: ICD-10-PCS | Mod: CPTII,S$GLB,, | Performed by: NURSE PRACTITIONER

## 2023-02-01 PROCEDURE — 99204 OFFICE O/P NEW MOD 45 MIN: CPT | Mod: S$GLB,,, | Performed by: NURSE PRACTITIONER

## 2023-02-01 PROCEDURE — 3075F SYST BP GE 130 - 139MM HG: CPT | Mod: CPTII,S$GLB,, | Performed by: NURSE PRACTITIONER

## 2023-02-01 PROCEDURE — 80321 ALCOHOLS BIOMARKERS 1OR 2: CPT | Performed by: NURSE PRACTITIONER

## 2023-02-01 PROCEDURE — 3008F PR BODY MASS INDEX (BMI) DOCUMENTED: ICD-10-PCS | Mod: CPTII,S$GLB,, | Performed by: NURSE PRACTITIONER

## 2023-02-01 PROCEDURE — 1160F RVW MEDS BY RX/DR IN RCRD: CPT | Mod: CPTII,S$GLB,, | Performed by: NURSE PRACTITIONER

## 2023-02-01 PROCEDURE — 1159F MED LIST DOCD IN RCRD: CPT | Mod: CPTII,S$GLB,, | Performed by: NURSE PRACTITIONER

## 2023-02-01 PROCEDURE — 99999 PR PBB SHADOW E&M-EST. PATIENT-LVL V: CPT | Mod: PBBFAC,,, | Performed by: NURSE PRACTITIONER

## 2023-02-01 PROCEDURE — 82728 ASSAY OF FERRITIN: CPT | Performed by: NURSE PRACTITIONER

## 2023-02-01 PROCEDURE — 3080F DIAST BP >= 90 MM HG: CPT | Mod: CPTII,S$GLB,, | Performed by: NURSE PRACTITIONER

## 2023-02-01 PROCEDURE — 84466 ASSAY OF TRANSFERRIN: CPT | Performed by: NURSE PRACTITIONER

## 2023-02-01 PROCEDURE — 3075F PR MOST RECENT SYSTOLIC BLOOD PRESS GE 130-139MM HG: ICD-10-PCS | Mod: CPTII,S$GLB,, | Performed by: NURSE PRACTITIONER

## 2023-02-01 PROCEDURE — 80076 HEPATIC FUNCTION PANEL: CPT | Performed by: NURSE PRACTITIONER

## 2023-02-01 PROCEDURE — 3080F PR MOST RECENT DIASTOLIC BLOOD PRESSURE >= 90 MM HG: ICD-10-PCS | Mod: CPTII,S$GLB,, | Performed by: NURSE PRACTITIONER

## 2023-02-01 PROCEDURE — 3008F BODY MASS INDEX DOCD: CPT | Mod: CPTII,S$GLB,, | Performed by: NURSE PRACTITIONER

## 2023-02-01 PROCEDURE — 99204 PR OFFICE/OUTPT VISIT, NEW, LEVL IV, 45-59 MIN: ICD-10-PCS | Mod: S$GLB,,, | Performed by: NURSE PRACTITIONER

## 2023-02-01 NOTE — PATIENT INSTRUCTIONS
1. Schedule Fibroscan to stage liver disease.  2. Recommend Ultrasound of the liver every 1-2 years, depending upon Fibroscan results.  3. Repeat liver function tests and iron studies now.   4. Recommend reduction in alcohol intake by 50%, with end goal of total abstinence.  5. Avoid herbal supplements/alternative remedies.  6. Return to clinic in 2 weeks

## 2023-02-01 NOTE — PROGRESS NOTES
[facility] New Patient Visit    Reason for Visit: Elevated LFT's    PCP: Phillip Castañeda    HPI:  This is a 37 y.o. male with PMH noted below, here for evaluation of elevated LFT's, referred by Rheumatology (seen once for + HUBERT with low titer and no s/s autoimmune disease).    The patient's risk factors for fatty liver disease include:     Obesity                                        No; BMI 23.67  Dyslipidemia                                Yes; Not currently on treatment T. Chol 313, , ,  (10/2022).  Insulin resistance/Diabetes         No; Last HgbA1c was 5.2% (10/2022)    He has a history of heavy alcohol intake since approximately the age of 17 (drink of choice is rum), and has had elevated liver enzymes in a hepatocellular pattern since at least 6/2021. He has tried counseling in the past to treat his depression and anxiety without much success. Most recent LFT's in 10/22 showed an AST of 91, ALT of 65; synthetic function was normal. Ferritin was noted to be significantly elevated at 813 in 12/2022. Iron saturation was normal at 29%, suggesting that ferritin elevation is likely secondary to alcohol abuse. Abdominal US in 11/2022 showed a normal sized liver (14.5 cm), with fatty infiltration (HRI 2.3). There were no concerning liver lesions or ascites seen on US; spleen size was normal (9.4 cm).   He has never undergone a liver biopsy or non-invasive staging exam. He has no known family history of liver disease, though he notes that his father has a history of heavy alcohol use as well. He has significantly reduced his alcohol intake over the past month, now consuming on average a 6 pack of beer every weekend. He does not use illicit drugs. Viral hepatitis testing was negative. ASMA and AMA were also negative on recent labs. He is well appearing, and has no signs or symptoms of hepatic decompensation including jaundice, dark urine, pruritus, abdominal distention, lower  extremity edema, hematemesis, melena, or periods of confusion suggestive of hepatic encephalopathy. Further ROS as below.      PMHX:  has a past medical history of Depression and Hypertension.    PSHX:  has no past surgical history on file.    The patient's social and family histories were reviewed by me and updated in the appropriate section of the electronic medical record.    Review of patient's allergies indicates:  No Known Allergies    Current Outpatient Medications on File Prior to Visit   Medication Sig Dispense Refill    EScitalopram oxalate (LEXAPRO) 10 MG tablet Take 1 tablet (10 mg total) by mouth once daily. 30 tablet 11    LORazepam (ATIVAN) 1 MG tablet One p.o. q.d. p.r.n. anxiety may increase to b.i.d. if absolutely necessary 30 tablet 2    losartan-hydrochlorothiazide 100-25 mg (HYZAAR) 100-25 mg per tablet TAKE 1 TABLET BY MOUTH EVERY DAY 90 tablet 3     No current facility-administered medications on file prior to visit.       SOCIAL HISTORY:   Social History     Tobacco Use   Smoking Status Every Day    Types: Vaping with nicotine   Smokeless Tobacco Never     Social History     Substance and Sexual Activity   Alcohol Use Yes    Comment: history of heavy alcohol intake since age 17 - currently drinking 6 pack of beer every weekend     Social History     Substance and Sexual Activity   Drug Use Never     ROS:   GENERAL: Denies fever, chills, weight loss/gain, fatigue + intermittent night sweats  HEENT: Denies headaches, dizziness, vision/hearing changes  CARDIOVASCULAR: Denies chest pain, palpitations, or edema  RESPIRATORY: Denies dyspnea, cough  GI: Denies abdominal pain, rectal bleeding, nausea, vomiting. No change in bowel pattern or color  : Denies dysuria, hematuria   SKIN: Denies rash, itching   NEURO: Denies confusion, memory loss, or mood changes + intermittent tremors and convulsive type symptoms - CT head negative ? Related to alcohol withdrawal.  PSYCH: + depression and  "anxiety  HEME/LYMPH: Denies easy bruising or bleeding    Objective Findings:    PHYSICAL EXAM:   Friendly White male, in no acute distress; alert and oriented to person, place and time.  VITALS: BP (!) 136/92 (BP Location: Right arm, Patient Position: Sitting, BP Method: Medium (Automatic))   Pulse 74   Temp 97.6 °F (36.4 °C) (Oral)   Resp 16   Ht 5' 9" (1.753 m)   Wt 72.7 kg (160 lb 4.4 oz)   SpO2 100%   BMI 23.67 kg/m²   HENT: Normocephalic, without obvious abnormality.   EYES: Sclerae anicteric.   NECK: No obvious masses.  CARDIOVASCULAR: Regular rate.  RESPIRATORY: Normal respiratory effort.  GI: Soft, non-tender, non-distended.   EXTREMITIES:  No clubbing, cyanosis or edema.  SKIN: Warm and dry. No jaundice. No rashes noted to exposed skin.   NEURO:  Normal gait. No asterixis.  PSYCH:  Memory intact. Thought and speech pattern appropriate. Behavior normal. + depression and anxiety     DIAGNOSTIC STUDIES:    US Abdomen Complete  Narrative: EXAMINATION:  US ABDOMEN COMPLETE    CLINICAL HISTORY:  Other specified abnormal findings of blood chemistry    TECHNIQUE:  Complete abdominal ultrasound (including pancreas, aorta, liver, gallbladder, common bile duct, IVC, kidneys, and spleen) was performed.    COMPARISON:  None    FINDINGS:  Pancreas: The visualized portions of pancreas appear normal.  Round isoechoic structure adjacent to the pancreatic tail measuring 2.2 x 1.9 x 1.8 cm.  Finding could represent a splenule or lymph node.    Aorta: No aneurysm.    Liver: 14.5 cm, normal in size. Diffusely increased parenchymal echogenicity consistent with steatosis. Hepatic renal index measures 2.3.  No focal lesions.    Gallbladder: No calculi, wall thickening, or pericholecystic fluid.  Negative sonographic Art's sign.    Biliary system: 2 mm common bile duct.  No intrahepatic ductal dilatation.    Inferior vena cava: Normal in appearance.    Right kidney: 11.7 cm. Multiple echogenic foci demonstrating twinkle " artifact consistent with nonobstructing stones measuring up to 0.7 cm.  No hydronephrosis.    Left kidney: 9.2 cm. Small cyst measuring 1 cm.  No hydronephrosis.    Spleen: 9.4 x 3.3 cm.  Normal in size with homogeneous echotexture.    Miscellaneous: No ascites.  Impression: 1. Hepatic steatosis.  2. Round structure adjacent to the pancreatic tail which could represent an enlarged lymph node or splenule.  Further evaluation may be obtained with contrast enhanced CT as clinically indicated.  3. Multiple nonobstructing right renal stones.  4. Small left renal cyst.    Electronically signed by: David Rai  Date:    11/16/2022  Time:    08:15    FIBROSCAN - Ordered at visit.    ASSESSMENT & PLAN:  37 y.o. White male with:    1. Alcohol induced fatty liver  Hepatic Function Panel    Ferritin    Iron and TIBC    Phosphatidylethanol (PETH)    FibroScan [place] (Vibration Controlled Transient Elastography)    CANCELED: FibroScan [place] (Vibration Controlled Transient Elastography)      2. Alcohol abuse        3. Elevated LFTs  Ambulatory referral/consult to Hepatology    Hepatic Function Panel    Ferritin    Iron and TIBC    Phosphatidylethanol (PETH)    FibroScan [place] (Vibration Controlled Transient Elastography)    CANCELED: FibroScan [place] (Vibration Controlled Transient Elastography)      4. Elevated ferritin level  Ambulatory referral/consult to Hepatology    Hepatic Function Panel    Ferritin    Iron and TIBC    Phosphatidylethanol (PETH)    FibroScan [place] (Vibration Controlled Transient Elastography)    CANCELED: FibroScan [place] (Vibration Controlled Transient Elastography)      5. Hyperlipidemia, unspecified hyperlipidemia type  Hepatic Function Panel    Ferritin    Iron and TIBC    Phosphatidylethanol (PETH)    FibroScan [place] (Vibration Controlled Transient Elastography)      6. Hypertension, unspecified type  Hepatic Function Panel    Ferritin    Iron and TIBC     Phosphatidylethanol (PETH)    FibroScan [place] (Vibration Controlled Transient Elastography)    CANCELED: FibroScan [place] (Vibration Controlled Transient Elastography)        - Schedule Fibroscan to stage liver disease.  - Recommend Ultrasound of the liver every 1-2 years, depending upon Fibroscan results.  - Repeat liver function tests and iron studies today.   - Recommend reduction in alcohol intake by 50%, with end goal of total abstinence.  - Avoid herbal supplements/alternative remedies.    Follow up in about 3 weeks (around 2/22/2023). with Fibroscan before visit.    Thank you for allowing me to participate in the care of Pravin Galindo       Hepatology Nurse Practitioner  [facility]    CC'ed note to:   Ham Tavarez MD Bryan J Bertucci, MD

## 2023-02-04 LAB
CLINICAL BIOCHEMIST REVIEW: NORMAL
PLPETH BLD-MCNC: 75 NG/ML
POPETH BLD-MCNC: 313 NG/ML

## 2023-02-15 ENCOUNTER — PROCEDURE VISIT (OUTPATIENT)
Dept: HEPATOLOGY | Facility: CLINIC | Age: 38
End: 2023-02-15
Payer: COMMERCIAL

## 2023-02-15 ENCOUNTER — OFFICE VISIT (OUTPATIENT)
Dept: HEPATOLOGY | Facility: CLINIC | Age: 38
End: 2023-02-15
Payer: COMMERCIAL

## 2023-02-15 VITALS — HEIGHT: 69 IN | BODY MASS INDEX: 23.84 KG/M2 | WEIGHT: 160.94 LBS

## 2023-02-15 DIAGNOSIS — K70.0 ALCOHOL INDUCED FATTY LIVER: Primary | ICD-10-CM

## 2023-02-15 DIAGNOSIS — I10 HYPERTENSION, UNSPECIFIED TYPE: ICD-10-CM

## 2023-02-15 DIAGNOSIS — E78.5 HYPERLIPIDEMIA, UNSPECIFIED HYPERLIPIDEMIA TYPE: ICD-10-CM

## 2023-02-15 DIAGNOSIS — R79.89 ELEVATED LFTS: ICD-10-CM

## 2023-02-15 DIAGNOSIS — K76.0 HEPATIC STEATOSIS: Primary | ICD-10-CM

## 2023-02-15 DIAGNOSIS — R79.89 ELEVATED FERRITIN LEVEL: ICD-10-CM

## 2023-02-15 PROCEDURE — 1159F PR MEDICATION LIST DOCUMENTED IN MEDICAL RECORD: ICD-10-PCS | Mod: CPTII,S$GLB,, | Performed by: NURSE PRACTITIONER

## 2023-02-15 PROCEDURE — 3008F BODY MASS INDEX DOCD: CPT | Mod: CPTII,S$GLB,, | Performed by: NURSE PRACTITIONER

## 2023-02-15 PROCEDURE — 76981 FIBROSCAN NEW ORLEANS: ICD-10-PCS | Mod: S$GLB,,, | Performed by: NURSE PRACTITIONER

## 2023-02-15 PROCEDURE — 99214 PR OFFICE/OUTPT VISIT, EST, LEVL IV, 30-39 MIN: ICD-10-PCS | Mod: S$GLB,,, | Performed by: NURSE PRACTITIONER

## 2023-02-15 PROCEDURE — 1160F RVW MEDS BY RX/DR IN RCRD: CPT | Mod: CPTII,S$GLB,, | Performed by: NURSE PRACTITIONER

## 2023-02-15 PROCEDURE — 1160F PR REVIEW ALL MEDS BY PRESCRIBER/CLIN PHARMACIST DOCUMENTED: ICD-10-PCS | Mod: CPTII,S$GLB,, | Performed by: NURSE PRACTITIONER

## 2023-02-15 PROCEDURE — 99999 PR PBB SHADOW E&M-EST. PATIENT-LVL III: ICD-10-PCS | Mod: PBBFAC,,, | Performed by: NURSE PRACTITIONER

## 2023-02-15 PROCEDURE — 76981 USE PARENCHYMA: CPT | Mod: S$GLB,,, | Performed by: NURSE PRACTITIONER

## 2023-02-15 PROCEDURE — 1159F MED LIST DOCD IN RCRD: CPT | Mod: CPTII,S$GLB,, | Performed by: NURSE PRACTITIONER

## 2023-02-15 PROCEDURE — 99214 OFFICE O/P EST MOD 30 MIN: CPT | Mod: S$GLB,,, | Performed by: NURSE PRACTITIONER

## 2023-02-15 PROCEDURE — 3008F PR BODY MASS INDEX (BMI) DOCUMENTED: ICD-10-PCS | Mod: CPTII,S$GLB,, | Performed by: NURSE PRACTITIONER

## 2023-02-15 PROCEDURE — 99999 PR PBB SHADOW E&M-EST. PATIENT-LVL III: CPT | Mod: PBBFAC,,, | Performed by: NURSE PRACTITIONER

## 2023-02-15 NOTE — PATIENT INSTRUCTIONS
- Fibroscan today to stage liver disease.  - Recommend further reduction in alcohol intake, with end goal of total abstinence.  - Repeat liver function tests and lipids in 2 months.   - Recommend Ultrasound of the liver every 2 years, next due 11/2024.  - Avoid herbal supplements/alternative remedies.

## 2023-02-15 NOTE — PROCEDURES
FibroScan Daisy    Date/Time: 2/15/2023 3:15 PM  Performed by: Ayala Ellsworth NP  Authorized by: Ayala Ellsworth NP     Diagnosis:  Alcohol    Probe:  M    Universal Protocol: Patient's identity, procedure and site were verified, confirmatory pause was performed.  Discussed procedure including risks and potential complications.  Questions answered.  Patient verbalizes understanding and wishes to proceed with VCTE.     Procedure: After providing explanations of the procedure, patient was placed in the supine position with right arm in maximum abduction to allow optimal exposure of right lateral abdomen.  Patient was briefly assessed, Testing was performed in the mid-axillary location, 50Hz Shear Wave pulses were applied and the resulting Shear Wave and Propagation Speed detected with a 3.5 MHz ultrasonic signal, using the FibroScan probe, Skin to liver capsule distance and liver parenchyma were accessed during the entire examination with the FibroScan probe, Patient was instructed to breathe normally and to abstain from sudden movements during the procedure, allowing for random measurements of liver stiffness. At least 10 Shear Waves were produced, Individual measurements of each Shear Wave were calculated.  Patient tolerated the procedure well with no complications.  Meets discharge criteria as was dismissed.  Rates pain 0 out of 10.  Patient will follow up with ordering provider to review results.    Findings  Median liver stiffness score:  5.8  CAP Reading: dB/m:  300    IQR/med %:  9  Interpretation  Fibrosis interpretation is based on medial liver stiffness - Kilopascal (kPa).    Fibrosis Stage:  F 0-1  Steatosis interpretation is based on controlled attenuation parameter - (dB/m).    Steatosis Grade:  S3

## 2023-02-15 NOTE — PROGRESS NOTES
[facility] Established Patient Visit    Reason for Visit: Fatty liver    PCP: Phillip Castañeda    HPI:  This is a 37 y.o. male with PMH noted below, here for follow up for fatty liver. He was last seen in clinic by myself on 2/1/2023, and was initially referred by Rheumatology (seen once for + HUBERT with low titer and no s/s autoimmune disease).    The patient's risk factors for fatty liver disease include:     Obesity                                        No; BMI 23.77  Dyslipidemia                                Yes; Not currently on treatment T. Chol 313, , ,  (10/2022).  Insulin resistance/Diabetes         No; Last HgbA1c was 5.2% (10/2022)    He has a history of heavy alcohol intake since approximately the age of 17 (drink of choice is rum), and has had elevated liver enzymes in a hepatocellular pattern since at least 6/2021. He has tried counseling in the past to treat his depression and anxiety without much success. LFT's in 10/2022 prior to initial visit showed an AST of 91, ALT of 65; synthetic function was normal. Ferritin was noted to be significantly elevated at 813 in 12/2022. Iron saturation was normal at 29%, suggesting that ferritin elevation is likely secondary to alcohol abuse.     Abdominal Ultrasound in 11/2022 showed a normal sized liver (14.5 cm), with fatty infiltration (HRI 2.3). There were no concerning liver lesions or ascites seen on US; spleen size was normal (9.4 cm). He has never undergone a liver biopsy. He has no known family history of liver disease, though he notes that his father has a history of heavy alcohol use as well. He has significantly reduced his alcohol intake over the past month, now consuming on average a 6 pack of beer or less every weekend.     He does not use illicit drugs. Viral hepatitis testing was negative. ASMA and AMA were also negative on recent labs. Peth > 300. LFT's have improved significantly with reduction in alcohol  intake (ALT 41, AST 30). Ferritin remains elevated (> 600), but is improved as well with reduction in alcohol intake. Fibroscan today to stage liver disease is suggestive of severe fatty infiltration of the liver (S3), with F0-F1 fibrosis and a low likelihood of cirrhosis. He is well appearing, and has no signs or symptoms of hepatic decompensation including jaundice, dark urine, pruritus, abdominal distention, lower extremity edema, hematemesis, melena, or periods of confusion suggestive of hepatic encephalopathy.      PMHX:  has a past medical history of Alcohol induced fatty liver, Depression, HLD (hyperlipidemia), and Hypertension.    PSHX:  has no past surgical history on file.    The patient's social and family histories were reviewed by me and updated in the appropriate section of the electronic medical record.    Review of patient's allergies indicates:  No Known Allergies    Current Outpatient Medications on File Prior to Visit   Medication Sig Dispense Refill    EScitalopram oxalate (LEXAPRO) 10 MG tablet Take 1 tablet (10 mg total) by mouth once daily. 30 tablet 11    LORazepam (ATIVAN) 1 MG tablet One p.o. q.d. p.r.n. anxiety may increase to b.i.d. if absolutely necessary 30 tablet 2    losartan-hydrochlorothiazide 100-25 mg (HYZAAR) 100-25 mg per tablet TAKE 1 TABLET BY MOUTH EVERY DAY 90 tablet 3     No current facility-administered medications on file prior to visit.       SOCIAL HISTORY:   Social History     Tobacco Use   Smoking Status Every Day    Types: Vaping with nicotine   Smokeless Tobacco Never     Social History     Substance and Sexual Activity   Alcohol Use Yes    Comment: history of heavy alcohol intake since age 17 - currently drinking 6 pack of beer every weekend     Social History     Substance and Sexual Activity   Drug Use Never     ROS:   GENERAL: Denies fever, chills, weight loss/gain, fatigue   HEENT: Denies headaches, dizziness, vision/hearing changes  CARDIOVASCULAR: Denies  "chest pain, palpitations, or edema  RESPIRATORY: Denies dyspnea, cough  GI: Denies abdominal pain, rectal bleeding, nausea, vomiting. No change in bowel pattern or color  : Denies dysuria, hematuria   SKIN: Denies rash, itching   NEURO: Denies confusion, memory loss, or mood changes   PSYCH: + depression and anxiety  HEME/LYMPH: Denies easy bruising or bleeding    Objective Findings:    PHYSICAL EXAM:   Friendly White male, in no acute distress; alert and oriented to person, place and time.  VITALS: Ht 5' 9" (1.753 m)   Wt 73 kg (160 lb 15 oz)   BMI 23.77 kg/m²   HENT: Normocephalic, without obvious abnormality.   EYES: Sclerae anicteric.   NECK: No obvious masses.  CARDIOVASCULAR: Regular rate.  RESPIRATORY: Normal respiratory effort.  GI: Soft, non-tender, non-distended.   EXTREMITIES:  No clubbing, cyanosis or edema.  SKIN: Warm and dry. No jaundice. No rashes noted to exposed skin.   NEURO:  Normal gait. No asterixis.  PSYCH:  Memory intact. Thought and speech pattern appropriate. Behavior normal. + depression and anxiety     DIAGNOSTIC STUDIES:    US Abdomen Complete  Narrative: EXAMINATION:  US ABDOMEN COMPLETE    CLINICAL HISTORY:  Other specified abnormal findings of blood chemistry    TECHNIQUE:  Complete abdominal ultrasound (including pancreas, aorta, liver, gallbladder, common bile duct, IVC, kidneys, and spleen) was performed.    COMPARISON:  None    FINDINGS:  Pancreas: The visualized portions of pancreas appear normal.  Round isoechoic structure adjacent to the pancreatic tail measuring 2.2 x 1.9 x 1.8 cm.  Finding could represent a splenule or lymph node.    Aorta: No aneurysm.    Liver: 14.5 cm, normal in size. Diffusely increased parenchymal echogenicity consistent with steatosis. Hepatic renal index measures 2.3.  No focal lesions.    Gallbladder: No calculi, wall thickening, or pericholecystic fluid.  Negative sonographic Art's sign.    Biliary system: 2 mm common bile duct.  No " intrahepatic ductal dilatation.    Inferior vena cava: Normal in appearance.    Right kidney: 11.7 cm. Multiple echogenic foci demonstrating twinkle artifact consistent with nonobstructing stones measuring up to 0.7 cm.  No hydronephrosis.    Left kidney: 9.2 cm. Small cyst measuring 1 cm.  No hydronephrosis.    Spleen: 9.4 x 3.3 cm.  Normal in size with homogeneous echotexture.    Miscellaneous: No ascites.  Impression: 1. Hepatic steatosis.  2. Round structure adjacent to the pancreatic tail which could represent an enlarged lymph node or splenule.  Further evaluation may be obtained with contrast enhanced CT as clinically indicated.  3. Multiple nonobstructing right renal stones.  4. Small left renal cyst.    Electronically signed by: David Rai  Date:    11/16/2022  Time:    08:15    FIBROSCAN 2/15/2023:    Findings  Median liver stiffness score:  5.8  CAP Reading: dB/m:  300     IQR/med %:  9  Interpretation  Fibrosis interpretation is based on medial liver stiffness - Kilopascal (kPa).     Fibrosis Stage:  F 0-1  Steatosis interpretation is based on controlled attenuation parameter - (dB/m).     Steatosis Grade:  S3    ASSESSMENT & PLAN:  37 y.o. White male with:    1. Alcohol induced fatty liver  Hepatic Function Panel    Phosphatidylethanol (PETH)    Lipid Panel      2. Elevated LFTs  Hepatic Function Panel    Phosphatidylethanol (PETH)    Lipid Panel      3. Hyperlipidemia, unspecified hyperlipidemia type  Hepatic Function Panel    Phosphatidylethanol (PETH)    Lipid Panel        - Fibroscan today to stage liver disease.  - Recommend further reduction in alcohol intake, with end goal of total abstinence.  - Repeat liver function tests and lipids in 2 months.   - Recommend Ultrasound of the liver every 2 years, next due 11/2024.  - Avoid herbal supplements/alternative remedies.    Follow up in about 6 months (around 8/15/2023). if LFT's stable/improved on next labs.     Thank you for allowing me to  participate in the care of Pravin Galindo       Hepatology Nurse Practitioner  [facility]    CC'ed note to:   No ref. provider found  Phillip Castañeda MD

## 2023-05-17 PROBLEM — R11.0 NAUSEA: Status: ACTIVE | Noted: 2023-05-17

## 2023-05-17 PROBLEM — M25.512 ACUTE PAIN OF LEFT SHOULDER: Status: ACTIVE | Noted: 2023-05-17

## 2023-06-28 ENCOUNTER — PATIENT MESSAGE (OUTPATIENT)
Dept: PSYCHOLOGY | Facility: CLINIC | Age: 38
End: 2023-06-28
Payer: COMMERCIAL

## 2023-06-28 ENCOUNTER — OFFICE VISIT (OUTPATIENT)
Dept: PRIMARY CARE CLINIC | Facility: CLINIC | Age: 38
End: 2023-06-28
Payer: COMMERCIAL

## 2023-06-28 VITALS
WEIGHT: 153.56 LBS | DIASTOLIC BLOOD PRESSURE: 80 MMHG | RESPIRATION RATE: 18 BRPM | HEART RATE: 96 BPM | OXYGEN SATURATION: 98 % | BODY MASS INDEX: 22.74 KG/M2 | SYSTOLIC BLOOD PRESSURE: 116 MMHG | TEMPERATURE: 97 F | HEIGHT: 69 IN

## 2023-06-28 DIAGNOSIS — F32.0 CURRENT MILD EPISODE OF MAJOR DEPRESSIVE DISORDER WITHOUT PRIOR EPISODE: Primary | ICD-10-CM

## 2023-06-28 DIAGNOSIS — Z87.442 HISTORY OF NEPHROLITHIASIS: ICD-10-CM

## 2023-06-28 DIAGNOSIS — L30.1 DYSHIDROSIS: ICD-10-CM

## 2023-06-28 DIAGNOSIS — S16.1XXD STRAIN OF NECK MUSCLE, SUBSEQUENT ENCOUNTER: ICD-10-CM

## 2023-06-28 DIAGNOSIS — I10 HYPERTENSION, UNSPECIFIED TYPE: ICD-10-CM

## 2023-06-28 PROCEDURE — 3008F PR BODY MASS INDEX (BMI) DOCUMENTED: ICD-10-PCS | Mod: CPTII,S$GLB,, | Performed by: FAMILY MEDICINE

## 2023-06-28 PROCEDURE — 99214 PR OFFICE/OUTPT VISIT, EST, LEVL IV, 30-39 MIN: ICD-10-PCS | Mod: S$GLB,,, | Performed by: FAMILY MEDICINE

## 2023-06-28 PROCEDURE — 99999 PR PBB SHADOW E&M-EST. PATIENT-LVL III: ICD-10-PCS | Mod: PBBFAC,,, | Performed by: FAMILY MEDICINE

## 2023-06-28 PROCEDURE — 3079F DIAST BP 80-89 MM HG: CPT | Mod: CPTII,S$GLB,, | Performed by: FAMILY MEDICINE

## 2023-06-28 PROCEDURE — 99214 OFFICE O/P EST MOD 30 MIN: CPT | Mod: S$GLB,,, | Performed by: FAMILY MEDICINE

## 2023-06-28 PROCEDURE — 3079F PR MOST RECENT DIASTOLIC BLOOD PRESSURE 80-89 MM HG: ICD-10-PCS | Mod: CPTII,S$GLB,, | Performed by: FAMILY MEDICINE

## 2023-06-28 PROCEDURE — 3008F BODY MASS INDEX DOCD: CPT | Mod: CPTII,S$GLB,, | Performed by: FAMILY MEDICINE

## 2023-06-28 PROCEDURE — 3074F SYST BP LT 130 MM HG: CPT | Mod: CPTII,S$GLB,, | Performed by: FAMILY MEDICINE

## 2023-06-28 PROCEDURE — 3074F PR MOST RECENT SYSTOLIC BLOOD PRESSURE < 130 MM HG: ICD-10-PCS | Mod: CPTII,S$GLB,, | Performed by: FAMILY MEDICINE

## 2023-06-28 PROCEDURE — 99999 PR PBB SHADOW E&M-EST. PATIENT-LVL III: CPT | Mod: PBBFAC,,, | Performed by: FAMILY MEDICINE

## 2023-06-28 RX ORDER — LORAZEPAM 1 MG/1
TABLET ORAL
Qty: 30 TABLET | Refills: 2 | Status: SHIPPED | OUTPATIENT
Start: 2023-06-28 | End: 2023-08-01

## 2023-06-28 RX ORDER — ESCITALOPRAM OXALATE 20 MG/1
TABLET ORAL
Qty: 90 TABLET | Refills: 3 | Status: SHIPPED | OUTPATIENT
Start: 2023-06-28

## 2023-06-28 RX ORDER — LOSARTAN POTASSIUM AND HYDROCHLOROTHIAZIDE 25; 100 MG/1; MG/1
1 TABLET ORAL DAILY
Qty: 90 TABLET | Refills: 3 | Status: SHIPPED | OUTPATIENT
Start: 2023-06-28 | End: 2023-10-17 | Stop reason: SDUPTHER
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
## 2023-07-25 ENCOUNTER — TELEPHONE (OUTPATIENT)
Dept: PRIMARY CARE CLINIC | Facility: CLINIC | Age: 38
End: 2023-07-25
Payer: COMMERCIAL

## 2023-07-25 NOTE — TELEPHONE ENCOUNTER
----- Message from Bruna Dunaway sent at 7/25/2023 11:52 AM CDT -----  Contact: Joy/Jennifer [phone]  Caller is requesting an earlier appointment then we can schedule.  Caller is requesting a message be sent to the provider.  When is the next available appointment with their provider:  08/01/2023  Reason for the appointment:  hiccups and vomiting  Patient preference of timeframe to be scheduled:  asap  Would the patient like a call back, or a response through their [facility] portal?:   call  Comments:      Please call and advise.    Thank You

## 2023-07-25 NOTE — TELEPHONE ENCOUNTER
Called patient in regards to message, Patient said that he will keep his appointment as schedule. I informed that that I didn't have anything soon. Patient verbalized understand.

## 2023-08-01 ENCOUNTER — OFFICE VISIT (OUTPATIENT)
Dept: PRIMARY CARE CLINIC | Facility: CLINIC | Age: 38
End: 2023-08-01
Payer: COMMERCIAL

## 2023-08-01 VITALS
OXYGEN SATURATION: 97 % | DIASTOLIC BLOOD PRESSURE: 80 MMHG | TEMPERATURE: 95 F | BODY MASS INDEX: 23.95 KG/M2 | WEIGHT: 161.69 LBS | RESPIRATION RATE: 16 BRPM | SYSTOLIC BLOOD PRESSURE: 115 MMHG | HEIGHT: 69 IN | HEART RATE: 106 BPM

## 2023-08-01 DIAGNOSIS — K92.0 HEMATEMESIS, UNSPECIFIED WHETHER NAUSEA PRESENT: Primary | ICD-10-CM

## 2023-08-01 DIAGNOSIS — D22.9 CHANGE IN SKIN MOLE: ICD-10-CM

## 2023-08-01 DIAGNOSIS — L20.9 ATOPIC DERMATITIS OF BOTH HANDS: ICD-10-CM

## 2023-08-01 PROCEDURE — 1160F RVW MEDS BY RX/DR IN RCRD: CPT | Mod: CPTII,S$GLB,, | Performed by: STUDENT IN AN ORGANIZED HEALTH CARE EDUCATION/TRAINING PROGRAM

## 2023-08-01 PROCEDURE — 99999 PR PBB SHADOW E&M-EST. PATIENT-LVL V: ICD-10-PCS | Mod: PBBFAC,,, | Performed by: STUDENT IN AN ORGANIZED HEALTH CARE EDUCATION/TRAINING PROGRAM

## 2023-08-01 PROCEDURE — 3008F PR BODY MASS INDEX (BMI) DOCUMENTED: ICD-10-PCS | Mod: CPTII,S$GLB,, | Performed by: STUDENT IN AN ORGANIZED HEALTH CARE EDUCATION/TRAINING PROGRAM

## 2023-08-01 PROCEDURE — 3074F PR MOST RECENT SYSTOLIC BLOOD PRESSURE < 130 MM HG: ICD-10-PCS | Mod: CPTII,S$GLB,, | Performed by: STUDENT IN AN ORGANIZED HEALTH CARE EDUCATION/TRAINING PROGRAM

## 2023-08-01 PROCEDURE — 1159F MED LIST DOCD IN RCRD: CPT | Mod: CPTII,S$GLB,, | Performed by: STUDENT IN AN ORGANIZED HEALTH CARE EDUCATION/TRAINING PROGRAM

## 2023-08-01 PROCEDURE — 3079F PR MOST RECENT DIASTOLIC BLOOD PRESSURE 80-89 MM HG: ICD-10-PCS | Mod: CPTII,S$GLB,, | Performed by: STUDENT IN AN ORGANIZED HEALTH CARE EDUCATION/TRAINING PROGRAM

## 2023-08-01 PROCEDURE — 99999 PR PBB SHADOW E&M-EST. PATIENT-LVL V: CPT | Mod: PBBFAC,,, | Performed by: STUDENT IN AN ORGANIZED HEALTH CARE EDUCATION/TRAINING PROGRAM

## 2023-08-01 PROCEDURE — 99214 OFFICE O/P EST MOD 30 MIN: CPT | Mod: S$GLB,,, | Performed by: STUDENT IN AN ORGANIZED HEALTH CARE EDUCATION/TRAINING PROGRAM

## 2023-08-01 PROCEDURE — 3074F SYST BP LT 130 MM HG: CPT | Mod: CPTII,S$GLB,, | Performed by: STUDENT IN AN ORGANIZED HEALTH CARE EDUCATION/TRAINING PROGRAM

## 2023-08-01 PROCEDURE — 99214 PR OFFICE/OUTPT VISIT, EST, LEVL IV, 30-39 MIN: ICD-10-PCS | Mod: S$GLB,,, | Performed by: STUDENT IN AN ORGANIZED HEALTH CARE EDUCATION/TRAINING PROGRAM

## 2023-08-01 PROCEDURE — 1160F PR REVIEW ALL MEDS BY PRESCRIBER/CLIN PHARMACIST DOCUMENTED: ICD-10-PCS | Mod: CPTII,S$GLB,, | Performed by: STUDENT IN AN ORGANIZED HEALTH CARE EDUCATION/TRAINING PROGRAM

## 2023-08-01 PROCEDURE — 3079F DIAST BP 80-89 MM HG: CPT | Mod: CPTII,S$GLB,, | Performed by: STUDENT IN AN ORGANIZED HEALTH CARE EDUCATION/TRAINING PROGRAM

## 2023-08-01 PROCEDURE — 3008F BODY MASS INDEX DOCD: CPT | Mod: CPTII,S$GLB,, | Performed by: STUDENT IN AN ORGANIZED HEALTH CARE EDUCATION/TRAINING PROGRAM

## 2023-08-01 PROCEDURE — 1159F PR MEDICATION LIST DOCUMENTED IN MEDICAL RECORD: ICD-10-PCS | Mod: CPTII,S$GLB,, | Performed by: STUDENT IN AN ORGANIZED HEALTH CARE EDUCATION/TRAINING PROGRAM

## 2023-08-01 RX ORDER — PANTOPRAZOLE SODIUM 40 MG/1
40 TABLET, DELAYED RELEASE ORAL DAILY
Qty: 30 TABLET | Refills: 11 | Status: SHIPPED | OUTPATIENT
Start: 2023-08-01 | End: 2024-07-31

## 2023-08-01 RX ORDER — TRIAMCINOLONE ACETONIDE 1 MG/G
CREAM TOPICAL 2 TIMES DAILY
Qty: 45 G | Refills: 0 | Status: SHIPPED | OUTPATIENT
Start: 2023-08-01
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
## 2023-08-01 NOTE — PATIENT INSTRUCTIONS
[facility]  Address: [address] [place], [place] [address], [place] [address]  Phone: [phone]

## 2023-09-18 ENCOUNTER — PATIENT MESSAGE (OUTPATIENT)
Dept: PRIMARY CARE CLINIC | Facility: CLINIC | Age: 38
End: 2023-09-18
Payer: COMMERCIAL

## 2023-10-17 RX ORDER — LOSARTAN POTASSIUM AND HYDROCHLOROTHIAZIDE 25; 100 MG/1; MG/1
1 TABLET ORAL DAILY
Qty: 90 TABLET | Refills: 0 | Status: SHIPPED | OUTPATIENT
Start: 2023-10-17 | End: 2024-01-16

## 2023-10-17 NOTE — TELEPHONE ENCOUNTER
Care Due:                  Date            Visit Type   Department     Provider  --------------------------------------------------------------------------------                                 -                              [facility] YOKOSLEON  Last Visit: 08-      CARE ([facility])   PRIMARY CARE   Jacob Redding                              [facility]                              PRIMARY      [facility] YOKOSLEON  Next Visit: 12-      CARE ([facility])   PRIMARY CARE   Phillip Castañeda                                                            Last  Test          Frequency    Reason                     Performed    Due Date  --------------------------------------------------------------------------------    CMP.........  12 months..  losartan-hydrochlorothiaz  10-   10-                             filippo......................    [employer] Embedded Care Due Messages. Reference number: [id number].   10/17/2023 3:29:28 PM CDT

## 2023-10-17 NOTE — TELEPHONE ENCOUNTER
----- Message from Fortunato Norton MA sent at 10/16/2023  5:09 PM CDT -----  Contact: patient    ----- Message -----  From: Jonny Dickson  Sent: 10/16/2023   3:25 PM CDT  To: Maddy Fisher Staff    Type:  RX Refill Request    Who Called: patient   Refill or New Rx:refill   RX Name and Strength:losartan-hydrochlorothiazide 100-25 mg (HYZAAR) 100-25 mg per tablet  How is the patient currently taking it? (ex. 1XDay):Sig - Route: Take 1 tablet by mouth once daily. - Oral  Is this a 30 day or 90 day RX:90  Preferred Pharmacy with phone number:[id number] [place], [place]  [facility] [place] [facility] [place] [address] [place] [facility] [place] [address]   Phone: [phone]    Local or Mail Order:Local   Ordering Provider:Dr. Castañeda   Would the patient rather a call back or a response via [facility]? Call back   Best Call Back Number:[phone]  Additional Information: please assist

## 2023-10-17 NOTE — TELEPHONE ENCOUNTER
Provider Staff:  Action required for this patient     Please see care gap opportunities below in Care Due Message: Labs (CMP) due 10.24.2023.     Thanks!  [facility]     Appointments     Last Visit   6/28/2023 Phillip Castañeda MD   Next Visit   12/18/2023 Phillip Castañeda MD     Refill Decision Note   Pravin Galindo  is requesting a refill authorization.  Brief Assessment and Rationale for Refill:  Approve       Medication Therapy Plan:  Labs (CMP) due 10.24.2023         Comments:     Note composed:3:43 PM 10/17/2023

## 2023-11-17 ENCOUNTER — TELEPHONE (OUTPATIENT)
Dept: PHARMACY | Facility: CLINIC | Age: 38
End: 2023-11-17
Payer: COMMERCIAL

## 2023-11-29 ENCOUNTER — OFFICE VISIT (OUTPATIENT)
Dept: GASTROENTEROLOGY | Facility: CLINIC | Age: 38
End: 2023-11-29
Payer: COMMERCIAL

## 2023-11-29 VITALS
RESPIRATION RATE: 18 BRPM | OXYGEN SATURATION: 97 % | DIASTOLIC BLOOD PRESSURE: 72 MMHG | WEIGHT: 160.25 LBS | HEIGHT: 69 IN | BODY MASS INDEX: 23.74 KG/M2 | HEART RATE: 97 BPM | SYSTOLIC BLOOD PRESSURE: 111 MMHG

## 2023-11-29 DIAGNOSIS — R19.7 DIARRHEA, UNSPECIFIED TYPE: Primary | ICD-10-CM

## 2023-11-29 DIAGNOSIS — K92.0 HEMATEMESIS, UNSPECIFIED WHETHER NAUSEA PRESENT: ICD-10-CM

## 2023-11-29 PROCEDURE — 3078F PR MOST RECENT DIASTOLIC BLOOD PRESSURE < 80 MM HG: ICD-10-PCS | Mod: CPTII,S$GLB,, | Performed by: INTERNAL MEDICINE

## 2023-11-29 PROCEDURE — 99204 OFFICE O/P NEW MOD 45 MIN: CPT | Mod: S$GLB,,, | Performed by: INTERNAL MEDICINE

## 2023-11-29 PROCEDURE — 99999 PR PBB SHADOW E&M-EST. PATIENT-LVL III: ICD-10-PCS | Mod: PBBFAC,,, | Performed by: INTERNAL MEDICINE

## 2023-11-29 PROCEDURE — 3008F PR BODY MASS INDEX (BMI) DOCUMENTED: ICD-10-PCS | Mod: CPTII,S$GLB,, | Performed by: INTERNAL MEDICINE

## 2023-11-29 PROCEDURE — 3074F SYST BP LT 130 MM HG: CPT | Mod: CPTII,S$GLB,, | Performed by: INTERNAL MEDICINE

## 2023-11-29 PROCEDURE — 3078F DIAST BP <80 MM HG: CPT | Mod: CPTII,S$GLB,, | Performed by: INTERNAL MEDICINE

## 2023-11-29 PROCEDURE — 1159F PR MEDICATION LIST DOCUMENTED IN MEDICAL RECORD: ICD-10-PCS | Mod: CPTII,S$GLB,, | Performed by: INTERNAL MEDICINE

## 2023-11-29 PROCEDURE — 99204 PR OFFICE/OUTPT VISIT, NEW, LEVL IV, 45-59 MIN: ICD-10-PCS | Mod: S$GLB,,, | Performed by: INTERNAL MEDICINE

## 2023-11-29 PROCEDURE — 3008F BODY MASS INDEX DOCD: CPT | Mod: CPTII,S$GLB,, | Performed by: INTERNAL MEDICINE

## 2023-11-29 PROCEDURE — 99999 PR PBB SHADOW E&M-EST. PATIENT-LVL III: CPT | Mod: PBBFAC,,, | Performed by: INTERNAL MEDICINE

## 2023-11-29 PROCEDURE — 3074F PR MOST RECENT SYSTOLIC BLOOD PRESSURE < 130 MM HG: ICD-10-PCS | Mod: CPTII,S$GLB,, | Performed by: INTERNAL MEDICINE

## 2023-11-29 PROCEDURE — 1159F MED LIST DOCD IN RCRD: CPT | Mod: CPTII,S$GLB,, | Performed by: INTERNAL MEDICINE

## 2023-11-29 RX ORDER — LORAZEPAM 1 MG/1
TABLET ORAL
COMMUNITY
Start: 2023-11-10

## 2023-11-29 NOTE — PROGRESS NOTES
Subjective:       Patient ID: Pravin Galindo is a 38 y.o. male.    Chief Complaint: Other (Blood in vomit . Haven't had the problem in about 3 months)    Patient here today to establish care with aforementioned complaints.  Patient reports history of a very sensitive gag reflex which causes him to vomit frequently.  One particular episode occurred a few months ago which was different as he recalls of vomiting danielle blood.  It occurred only once in has not persisted.  In addition he reports diarrhea which he attributes to poor appetite/significant alcohol use/abuse.  In the process of starting rehab.  Denies family history of inflammatory bowel disease or GI malignancy.  Denies unexplained weight loss or overt blood in stool.      Past Medical History:   Diagnosis Date    Alcohol induced fatty liver     Depression     HLD (hyperlipidemia)     Hypertension        No past surgical history on file.    Social History  Social History     Tobacco Use    Smoking status: Every Day     Types: Vaping with nicotine    Smokeless tobacco: Never   Substance Use Topics    Alcohol use: Yes     Comment: history of heavy alcohol intake since age 17 - currently drinking 6 pack of beer every weekend    Drug use: Never       Family History   Family history unknown: Yes       Review of Systems   HENT:  Negative for trouble swallowing.    Gastrointestinal:  Positive for diarrhea, nausea and vomiting. Negative for blood in stool.           Objective:      Physical Exam  Constitutional:       Appearance: He is well-developed.   HENT:      Head: Normocephalic and atraumatic.   Eyes:      Conjunctiva/sclera: Conjunctivae normal.   Pulmonary:      Effort: Pulmonary effort is normal. No respiratory distress.   Neurological:      Mental Status: He is alert and oriented to person, place, and time.   Psychiatric:         Behavior: Behavior normal.         Thought Content: Thought content normal.         Judgment: Judgment normal.            Pertinent labs and imaging studies reviewed    Assessment:       1. Diarrhea, unspecified type    2. Hematemesis, unspecified whether nausea present        Plan:       Schedule EGD   Send stool studies.  Could consider colonoscopy however will pursue noninvasive workup 1st given absence of red flag symptoms.    (Portions of this note were dictated using voice recognition software and may contain dictation related errors in spelling/grammar/syntax not found on text review)

## 2023-11-30 ENCOUNTER — TELEPHONE (OUTPATIENT)
Dept: PHARMACY | Facility: CLINIC | Age: 38
End: 2023-11-30
Payer: COMMERCIAL

## 2024-01-09 ENCOUNTER — TELEPHONE (OUTPATIENT)
Dept: PHARMACY | Facility: CLINIC | Age: 39
End: 2024-01-09
Payer: COMMERCIAL

## 2024-01-13 NOTE — TELEPHONE ENCOUNTER
Care Due:                  Date            Visit Type   Department     Provider  --------------------------------------------------------------------------------                                 -                              [facility]  Last Visit: 08-      CARE ([facility])   PRIMARY CARE   Jacob Redding                              [facility]                              PRIMARY      [facility] [place]  Next Visit: 02-      CARE ([facility])   PRIMARY CARE   Phillip Castañeda                                                            Last  Test          Frequency    Reason                     Performed    Due Date  --------------------------------------------------------------------------------    CMP.........  12 months..  losartan-hydrochlorothiaz  10-   10-                             filippo......................    [employer] Embedded Care Due Messages. Reference number: [id number].   1/13/2024 9:35:13 AM CST

## 2024-01-14 NOTE — TELEPHONE ENCOUNTER
Refill Routing Note   Medication(s) are not appropriate for processing by [facility] for the following reason(s):        Required labs outdated    [facility] action(s):  Defer     Requires labs : Yes             Appointments  past 12m or future 3m with PCP    Date Provider   Last Visit   6/28/2023 Phillip Castañeda MD   Next Visit   2/6/2024 Phillip Castañeda MD   ED visits in past 90 days: 0        Note composed:12:21 PM 01/14/2024

## 2024-01-16 RX ORDER — LOSARTAN POTASSIUM AND HYDROCHLOROTHIAZIDE 25; 100 MG/1; MG/1
1 TABLET ORAL
Qty: 90 TABLET | Refills: 1 | Status: SHIPPED | OUTPATIENT
Start: 2024-01-16

## 2024-02-06 ENCOUNTER — OFFICE VISIT (OUTPATIENT)
Dept: PRIMARY CARE CLINIC | Facility: CLINIC | Age: 39
End: 2024-02-06
Payer: COMMERCIAL

## 2024-02-06 VITALS
SYSTOLIC BLOOD PRESSURE: 138 MMHG | HEART RATE: 82 BPM | RESPIRATION RATE: 18 BRPM | HEIGHT: 69 IN | OXYGEN SATURATION: 99 % | BODY MASS INDEX: 26.47 KG/M2 | WEIGHT: 178.69 LBS | DIASTOLIC BLOOD PRESSURE: 84 MMHG

## 2024-02-06 DIAGNOSIS — F10.10 ALCOHOL ABUSE: Primary | ICD-10-CM

## 2024-02-06 DIAGNOSIS — E78.00 PURE HYPERCHOLESTEROLEMIA: ICD-10-CM

## 2024-02-06 DIAGNOSIS — F32.9 REACTIVE DEPRESSION: ICD-10-CM

## 2024-02-06 DIAGNOSIS — I10 HYPERTENSION, UNSPECIFIED TYPE: ICD-10-CM

## 2024-02-06 DIAGNOSIS — F32.0 CURRENT MILD EPISODE OF MAJOR DEPRESSIVE DISORDER WITHOUT PRIOR EPISODE: ICD-10-CM

## 2024-02-06 PROCEDURE — 99214 OFFICE O/P EST MOD 30 MIN: CPT | Mod: S$GLB,,, | Performed by: FAMILY MEDICINE

## 2024-02-06 PROCEDURE — 3008F BODY MASS INDEX DOCD: CPT | Mod: CPTII,S$GLB,, | Performed by: FAMILY MEDICINE

## 2024-02-06 PROCEDURE — 99999 PR PBB SHADOW E&M-EST. PATIENT-LVL V: CPT | Mod: PBBFAC,,, | Performed by: FAMILY MEDICINE

## 2024-02-06 PROCEDURE — 3075F SYST BP GE 130 - 139MM HG: CPT | Mod: CPTII,S$GLB,, | Performed by: FAMILY MEDICINE

## 2024-02-06 PROCEDURE — 1159F MED LIST DOCD IN RCRD: CPT | Mod: CPTII,S$GLB,, | Performed by: FAMILY MEDICINE

## 2024-02-06 PROCEDURE — 3079F DIAST BP 80-89 MM HG: CPT | Mod: CPTII,S$GLB,, | Performed by: FAMILY MEDICINE

## 2024-02-06 RX ORDER — ARIPIPRAZOLE 2 MG/1
2 TABLET ORAL
COMMUNITY
Start: 2023-12-22 | End: 2024-02-06 | Stop reason: SDUPTHER

## 2024-02-06 RX ORDER — ARIPIPRAZOLE 2 MG/1
2 TABLET ORAL DAILY
Qty: 90 TABLET | Refills: 3 | Status: SHIPPED | OUTPATIENT
Start: 2024-02-06
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
## 2024-04-25 ENCOUNTER — TELEPHONE (OUTPATIENT)
Dept: PRIMARY CARE CLINIC | Facility: CLINIC | Age: 39
End: 2024-04-25
Payer: COMMERCIAL

## 2024-04-25 NOTE — TELEPHONE ENCOUNTER
----- Message from Jennifertrang Oliverez sent at 4/24/2024  2:58 PM CDT -----  Contact: [phone]  1MEDICALADVICE     Patient is calling for Medical Advice regarding: shoulder pain     How long has patient had these symptoms:    Pharmacy name and phone#:  [id number] - [place] - [address] [facility] [address]  [address] [address]  Phone: [phone] Fax: [phone]       Would like response via [employer]:  no     Comments:  Pt is asking if the  can call in some muscle relaxers for him  
Called pt no answer   
20-Oct-2019 04:27

## 2024-05-28 ENCOUNTER — TELEPHONE (OUTPATIENT)
Dept: PRIMARY CARE CLINIC | Facility: CLINIC | Age: 39
End: 2024-05-28
Payer: COMMERCIAL

## 2024-05-28 NOTE — TELEPHONE ENCOUNTER
----- Message from Ashley Robertson sent at 5/28/2024  4:08 PM CDT -----  Contact: [phone] Pts spouse  1MEDICALADVICE     Patient is calling for Medical Advice regarding: not keeping anything down not even meds, vomiting, temp low, chills, nauseated    How long has patient had these symptoms: 3 days    Pharmacy name and phone#:   [id number] - [place] - [address] [facility] [address]  [address] [address]  Phone: [phone] Fax: [phone]        Would like response via [employer]:  Call Back Please. Pts spouse is asking if Dr Castañeda can please call in phenergan suppositories for the pt please. Thank you     Comments:

## 2024-07-11 RX ORDER — ESCITALOPRAM OXALATE 20 MG/1
TABLET ORAL
Qty: 90 TABLET | Refills: 1 | Status: SHIPPED | OUTPATIENT
Start: 2024-07-11

## 2024-07-11 NOTE — TELEPHONE ENCOUNTER
Provider Staff:  Action required for this patient    Requires labs      Please see care gap opportunities below in Care Due Message.    Thanks!  [facility]     Appointments      Date Provider   Last Visit   2/6/2024 Phillip Castañeda MD   Next Visit   Visit date not found Phillip Castañeda MD     Refill Decision Note   Pravin Galindo  is requesting a refill authorization.  Brief Assessment and Rationale for Refill:  Approve     Medication Therapy Plan:         Comments:     Note composed:4:33 AM 07/11/2024

## 2024-07-11 NOTE — TELEPHONE ENCOUNTER
Care Due:                  Date            Visit Type   Department     Provider  --------------------------------------------------------------------------------                                EP -                              PRIMARY      [facility]  Last Visit: 02-      CARE ([facility])   PRIMARY CARE   Phillip Castañeda  Next Visit: None Scheduled  None         None Found                                                            Last  Test          Frequency    Reason                     Performed    Due Date  --------------------------------------------------------------------------------    CMP.........  12 months..  losartan-hydrochlorothiaz  10-   10-                             filippo......................    [employer] Embedded Care Due Messages. Reference number: [id number].   7/11/2024 3:45:07 AM CDT

## 2024-09-19 ENCOUNTER — PATIENT MESSAGE (OUTPATIENT)
Dept: PRIMARY CARE CLINIC | Facility: CLINIC | Age: 39
End: 2024-09-19
Payer: COMMERCIAL

## 2025-04-30 DIAGNOSIS — I10 HYPERTENSION: ICD-10-CM
